# Patient Record
Sex: MALE | Race: WHITE | NOT HISPANIC OR LATINO | Employment: OTHER | URBAN - NONMETROPOLITAN AREA
[De-identification: names, ages, dates, MRNs, and addresses within clinical notes are randomized per-mention and may not be internally consistent; named-entity substitution may affect disease eponyms.]

---

## 2017-02-21 ENCOUNTER — OFFICE VISIT - GICH (OUTPATIENT)
Dept: FAMILY MEDICINE | Facility: OTHER | Age: 57
End: 2017-02-21

## 2017-02-21 ENCOUNTER — HISTORY (OUTPATIENT)
Dept: FAMILY MEDICINE | Facility: OTHER | Age: 57
End: 2017-02-21

## 2017-02-21 DIAGNOSIS — I10 ESSENTIAL (PRIMARY) HYPERTENSION: ICD-10-CM

## 2017-02-21 DIAGNOSIS — Z91.038 OTHER INSECT ALLERGY STATUS: ICD-10-CM

## 2017-02-21 LAB
A/G RATIO - HISTORICAL: 1.3 (ref 1–2)
ALBUMIN SERPL-MCNC: 4 G/DL (ref 3.5–5.7)
ALP SERPL-CCNC: 62 IU/L (ref 34–104)
ALT (SGPT) - HISTORICAL: 25 IU/L (ref 7–52)
ANION GAP - HISTORICAL: 8 (ref 5–18)
AST SERPL-CCNC: 20 IU/L (ref 13–39)
BILIRUB SERPL-MCNC: 0.6 MG/DL (ref 0.3–1)
BUN SERPL-MCNC: 21 MG/DL (ref 7–25)
BUN/CREAT RATIO - HISTORICAL: 18
CALCIUM SERPL-MCNC: 9.2 MG/DL (ref 8.6–10.3)
CHLORIDE SERPLBLD-SCNC: 105 MMOL/L (ref 98–107)
CHOL/HDL RATIO - HISTORICAL: 3.91
CHOLESTEROL TOTAL: 180 MG/DL
CO2 SERPL-SCNC: 25 MMOL/L (ref 21–31)
CREAT SERPL-MCNC: 1.2 MG/DL (ref 0.7–1.3)
GFR IF NOT AFRICAN AMERICAN - HISTORICAL: >60 ML/MIN/1.73M2
GLOBULIN - HISTORICAL: 3 G/DL (ref 2–3.7)
GLUCOSE SERPL-MCNC: 89 MG/DL (ref 70–105)
HDLC SERPL-MCNC: 46 MG/DL (ref 23–92)
LDLC SERPL CALC-MCNC: 116 MG/DL
NON-HDL CHOLESTEROL - HISTORICAL: 134 MG/DL
PATIENT STATUS - HISTORICAL: ABNORMAL
POTASSIUM SERPL-SCNC: 3.6 MMOL/L (ref 3.5–5.1)
PROT SERPL-MCNC: 7 G/DL (ref 6.4–8.9)
SODIUM SERPL-SCNC: 138 MMOL/L (ref 133–143)
TRIGL SERPL-MCNC: 90 MG/DL

## 2017-07-08 ENCOUNTER — HISTORY (OUTPATIENT)
Dept: FAMILY MEDICINE | Facility: OTHER | Age: 57
End: 2017-07-08

## 2017-07-08 ENCOUNTER — OFFICE VISIT - GICH (OUTPATIENT)
Dept: FAMILY MEDICINE | Facility: OTHER | Age: 57
End: 2017-07-08

## 2017-07-08 DIAGNOSIS — B02.9 ZOSTER WITHOUT COMPLICATIONS: ICD-10-CM

## 2017-12-27 NOTE — PROGRESS NOTES
Patient Information     Patient Name MRN Sex     Walter Oleary [id number] Male 1960      Progress Notes by Meka Coelho PA-C at 2017  9:00 AM     Author:  Meka Coelho PA-C Service:  (none) Author Type:  PHYS- Physician Assistant     Filed:  7/10/2017 11:24 AM Encounter Date:  2017 Status:  Signed     :  Meka Coelho PA-C (PHYS- Physician Assistant)            SUBJECTIVE:    Walter Oleary is a 56 y.o. male who presents for rash and headache.    HPI Comments: Patient reports that he had shoulder pain starting on Monday 7-3-17.  He  Went to the ER and because of the nature of the pain and a recent tick bit there, was started on doxycycline.  The burning type pain continued to worsen and yesterday he noticed that he was breaking out in a rash. He is concerned about shingles as he and the ER physician did discuss that possibility.    He is having a headache on the top of his head today. It is not a superficial skin pain. The headache is mild but the rash pain is severe 9/10 and that is his primary concern.        Patient Active Problem List     Diagnosis  Code     HTN (hypertension) I10     Psoriasis L40.9     Bee sting allergy Z91.038     Current Outpatient Prescriptions on File Prior to Visit       Medication  Sig Dispense Refill     acetaminophen (TYLENOL) 325 mg tablet Take 650 mg by mouth every 6 hours if needed. Max acetaminophen dose: 4000mg in 24 hrs.   Indications: FEVER, PAIN       aspirin 325 mg tablet Take 325 mg by mouth once daily with a meal.       diphenhydrAMINE (BENADRYL) 25 mg capsule Take 50 mg by mouth every 6 hours if needed (ALLERGY).       doxycycline (VIBRAMYCIN) 100 mg tablet Take 1 tablet by mouth 2 times daily for 14 days. 28 tablet 0     EPINEPHrine (EPIPEN) 0.3 mg/0.3 mL injection Inject 0.3 mg intramuscular one time if needed for Allergic Reaction for up to 1 dose. 2 Each 3     lisinopril-hydrochlorothiazide, 20-25 mg, (PRINZIDE, ZESTORETIC) 20-25 mg per  "tablet Take 1 tablet by mouth once daily. 90 tablet 3     No current facility-administered medications on file prior to visit.          REVIEW OF SYSTEMS:  Review of Systems   Constitutional: Negative for chills, fever and malaise/fatigue.   HENT: Negative for congestion and sore throat.    Eyes: Negative for pain, discharge and redness.   Respiratory: Negative for cough, shortness of breath and wheezing.    Cardiovascular: Negative for chest pain and palpitations.        He had some chest pain that was 'burning\" in nature when he was in the ER but that is better now.    Gastrointestinal: Negative for abdominal pain, nausea and vomiting.   Genitourinary: Negative for dysuria, frequency and urgency.   Musculoskeletal: Negative for myalgias.   Skin: Positive for rash. Negative for itching.   Neurological: Positive for headaches. Negative for dizziness.       OBJECTIVE:  /100  Pulse 60    EXAM:   Physical Exam   Constitutional: He is oriented to person, place, and time.   Well developed and well nourished in mild distress secondary to pain.    HENT:   Head: Normocephalic and atraumatic.   Right Ear: External ear normal.   Left Ear: External ear normal.   Nose: Nose normal.   Mouth/Throat: Oropharynx is clear and moist.   TMs intact and gray.   Eyes: Conjunctivae are normal. Pupils are equal, round, and reactive to light. Right eye exhibits no discharge. Left eye exhibits no discharge.   Neck: Neck supple.   Cardiovascular: Normal rate, regular rhythm and normal heart sounds.    No murmur heard.  Pulmonary/Chest: Effort normal and breath sounds normal. No respiratory distress.   Abdominal: Soft. Bowel sounds are normal. He exhibits no distension.   Musculoskeletal: He exhibits no edema.   Lymphadenopathy:     He has no cervical adenopathy.   Neurological: He is alert and oriented to person, place, and time. Gait normal.   Skin:   Erythematous papulovesicular rash following dermatome from posterior right " shoulder/back, under axilla to the upper arm.        ASSESSMENT/PLAN:    ICD-10-CM    1. Herpes zoster without complication B02.9 valACYclovir (VALTREX) 1 gram tablet      HYDROcodone-acetaminophen, 5-325 mg, (NORCO) per tablet        Plan:  Discussed contagious nature/ keep covered- do not contact infants or those who are immunocompromised.  Typical course discussed.  Complications discussed.  He can use hydrocodone for pain as he is in a great deal of pain secondary to this.    He may wish to follow up with his PCP next week but certainly should return should he have any new or worsening symptoms.  He understands and agrees with plan.      Meka Coelho PA-C ....................  7/10/2017   11:24 AM

## 2017-12-28 NOTE — PATIENT INSTRUCTIONS
Patient Information     Patient Name MRN Walter Barry [id number] Male 1960      Patient Instructions by Meka Coelho PA-C at 2017  9:00 AM     Author:  Meka Coelho PA-C Service:  (none) Author Type:  PHYS- Physician Assistant     Filed:  2017  9:32 AM Encounter Date:  2017 Status:  Signed     :  Meka Coelho PA-C (SHRAVAN- Physician Assistant)               Index German All languages Related topics   Shingles   ________________________________________________________________________  KEY POINTS    Shingles is a painful skin rash caused by the same virus that causes chickenpox.    If you ever had chickenpox, the virus stays in your body and can become active again at any time in your life and cause shingles.    Shingles is treated with antiviral and other medicines.  ________________________________________________________________________  What is shingles?   Shingles is a painful skin rash caused by the same virus that causes chickenpox.  Shingles is also called herpes zoster. This illness is most common in people over 50 years old.  What is the cause?  The virus that causes chickenpox and shingles is called varicella zoster. After you recover from chickenpox, the virus stays in your body but it is inactive. The virus can become active again at any time in your life and cause shingles if your body's immune system is weakened by things like:    Illness    Chemotherapy or radiation    Certain medicines, such as steroids    Aging  Your risk for getting shingles may be higher if you have been under a lot of stress. Sometimes shingles happens for no known reason.   You cannot have shingles unless you have already had chickenpox, and you cannot get shingles from someone else. However, a person with shingles can transmit chickenpox to a person who has never been exposed to the chickenpox virus. The virus is spread by contact with the blisters, which contain live virus in the fluid.  The blisters are no longer contagious after they dry up and form scabs.  What are the symptoms?  The first symptoms may include:    Burning, sharp pain; tingling; or numbness in your skin on one side of your body, head, or face    Severe itching or aching    Feeling tired    Feeling sick with fever, chills, headache, and upset stomach or belly pain  One to 14 days after you start feeling pain, you will notice a rash of small blisters on reddened skin. Within a few days after the rash appears, the blisters will turn yellow, then dry up and form scabs. Over the next 2 weeks the scabs drop off, and the skin heals over the next several days to weeks.  The blisters are usually found in a path, often extending from the back or side around to the middle of the belly. The blisters are usually on just one side of the body. They may also appear on one side of your face or scalp. The painful rash may be in the area of your ear or eye. When shingles occurs on the head or scalp, it may cause weakness of one side of the face, making that side of the face look droopy.  How is it diagnosed?  Your healthcare provider will ask about your symptoms and medical history and examine you. The diagnosis is usually obvious from the symptoms and the rash. If your provider wants to confirm the diagnosis, you may have lab tests to look for the virus in fluid from a blister.  How is it treated?  Several medicines can help treat shingles. Your healthcare provider may give you:    Antiviral medicine to help you heal faster    Pills or ointment to help relieve pain    Antibacterial medicine to treat bacterial infection of the blisters  For most people the pain of shingles goes away in the first month or two after the blisters heal. If you have shingles on your head or scalp, it may take longer for the pain to go away. Sometimes the virus damages a nerve. This can cause pain, numbness, or tingling for months or even years after the rash is healed  and is called postherpetic neuralgia. The older you are when you have shingles, the more likely it is that you will have postherpetic neuralgia. Taking antiviral medicine as soon as shingles is diagnosed may help prevent this problem.  How can I take care of myself?  Here are some things you can do to help relieve pain:    Take nonprescription pain medicine, such as acetaminophen. Acetaminophen may cause liver damage or other problems. Read the label carefully and take as directed. Unless recommended by your provider, don't take more than 3000 milligrams (mg) in 24 hours or take it for longer than 10 days. To make sure you don t take too much, check other medicines you take to see if they also contain acetaminophen. Ask your provider if you need to avoid drinking alcohol while taking this medicine.    Put cool, moist washcloths on the rash.    Try not to let clothing or bed linens rub against the rash and irritate it.  Rest in bed if you have fever and other symptoms of illness.  Ask your healthcare provider:    How and when you will get your test results    How long it will take to recover from this illness    If there are activities you should avoid and when you can return to normal activities    How to take care of yourself at home    What symptoms or problems you should watch for and what to do if you have them  Make sure you know when you should come back for a checkup. Keep all appointments for provider visits or tests.  How can I help prevent shingles?  If you have never had chickenpox, you can get a shot to help prevent infection with the chickenpox virus. Most children now get shots to prevent chickenpox.  If you are 50 or older, you can get a different shot that helps prevent shingles. This shot is recommended for people 60 years of age and older. The shingles shot does not always prevent shingles. However, if you do get shingles sometime after you got the shot, you may have less pain. The shingles shot is  not used to treat shingles once you have it.   You can also lessen your chances of getting shingles by trying to keep stress under control, exercising regularly, and eating a healthy diet.  If you have shingles, make sure that anyone who has not had chickenpox or the chickenpox shot does not come into close contact with you until the blisters are completely dry. You are no longer contagious after the blisters dry up and form scabs.  Developed by [employer].  Adult Advisor 2016.3 published by [employer].  Last modified: 2016-03-23  Last reviewed: 2015-11-02  This content is reviewed periodically and is subject to change as new health information becomes available. The information is intended to inform and educate and is not a replacement for medical evaluation, advice, diagnosis or treatment by a healthcare professional.  References   Adult Advisor 2016.3 Index    Copyright   2016 [employer], a division of [employer] All rights reserved.

## 2017-12-30 NOTE — NURSING NOTE
Patient Information     Patient Name MRN Sex Walter Reza [id number] Male 1960      Nursing Note by Kenna Clark at 2017  9:00 AM     Author:  Kenna Clark Service:  (none) Author Type:  (none)     Filed:  2017  9:26 AM Encounter Date:  2017 Status:  Signed     :  Kenna Clakr            Patient states that he was at ER on Thursday for tick bite.  Rash broke out after visit.  Having pain on right shoulder, headache  Kenna Clark LPN 2017 9:08 AM

## 2018-01-03 NOTE — NURSING NOTE
Patient Information     Patient Name MRN Walter Barry [id number] Male 1960      Nursing Note by Virgie Sadler at 2017  3:00 PM     Author:  Virgie Sadler Service:  (none) Author Type:  (none)     Filed:  2017  2:55 PM Encounter Date:  2017 Status:  Signed     :  Virgie Sadler            Patient presents for blood pressure medication refills.  Virgie Sadler LPN   2017  2:38 PM

## 2018-01-03 NOTE — PROGRESS NOTES
Patient Information     Patient Name MRN Sex     Walter Oleary [id number] Male 1960      Progress Notes by Jose Raul Trejo MD at 2017  3:00 PM     Author:  Jose Raul Trejo MD Service:  (none) Author Type:  Physician     Filed:  2017  3:02 PM Encounter Date:  2017 Status:  Signed     :  Jose Raul Trejo MD (Physician)            SUBJECTIVE:  Walter Oleary is a 56 y.o. male here for follow-up.  Patient has a history of hypertension and is due for refills. He is fasting today. He reports that his blood pressure is been well-controlled. He has had no chest pain or short of breath. He admits that he has not been working very hard over the winter and has gained some weight.    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 He is due for refills of his EpiPen.      Patient Active Problem List      Diagnosis Date Noted     Bee sting allergy 2017     Psoriasis 2016     HTN (hypertension) 09/15/2016       Past Medical History     Diagnosis  Date     Anaphylaxis 9/15/2016     Complex tear of medial meniscus of right knee      S/P right knee arthroscopy, surgery on 16       Past Surgical History       Procedure   Laterality Date     Total hip arthroplasty  Bilateral      [place]         Current Outpatient Prescriptions       Medication  Sig Dispense Refill     acetaminophen (TYLENOL) 325 mg tablet Take 650 mg by mouth every 6 hours if needed. Max acetaminophen dose: 4000mg in 24 hrs.   Indications: FEVER, PAIN       aspirin 325 mg tablet Take 325 mg by mouth once daily with a meal.       diphenhydrAMINE (BENADRYL) 25 mg capsule Take 50 mg by mouth every 6 hours if needed (ALLERGY).       EPINEPHrine (EPIPEN) 0.3 mg/0.3 mL injection Inject 0.3 mg intramuscular one time if needed for Allergic Reaction for up to 1 dose. 2 Each 3     lisinopril-hydrochlorothiazide, 20-25 mg, (PRINZIDE, ZESTORETIC) 20-25 mg per tablet Take 1 tablet by mouth once daily. 90 tablet  "3     No current facility-administered medications for this visit.      Medications have been reviewed by me and are current to the best of my knowledge and ability.      Allergies:  Allergies     Allergen  Reactions     Bee Sting [Hymenoptera Allergenic Extract] Hives and Angioedema       No family history on file.    Social History       Substance Use Topics         Smoking status:   Never Smoker     Smokeless tobacco:   Not on file     Alcohol use   0.0 oz/week     0 Standard drinks or equivalent per week        Comment: occasionally        ROS:    As above otherwise ROS is unremarkable.      OBJECTIVE:  /78  Pulse 72  Wt 107.5 kg (237 lb)  BMI 38.25 kg/m2    EXAM:  General Appearance: Pleasant, alert, appropriate appearance for age. No acute distress  Lungs: Normal chest wall and respirations. Clear to auscultation, no wheezes or crackles.  Cardiovascular: Regular rate and rhythm. S1, S2, no murmurs.  Musculoskeletal: No edema.    ASSESSEMENT AND PLAN:    Walter was seen today for blood pressure.    Diagnoses and all orders for this visit:    HTN (hypertension)  -     lisinopril-hydrochlorothiazide, 20-25 mg, (PRINZIDE, ZESTORETIC) 20-25 mg per tablet; Take 1 tablet by mouth once daily.  -     LIPID PANEL; Future  -     COMPLETE METABOLIC PANEL; Future    Bee sting allergy  -     EPINEPHrine (EPIPEN) 0.3 mg/0.3 mL injection; Inject 0.3 mg intramuscular one time if needed for Allergic Reaction for up to 1 dose.    Other orders  -     Cancel: EPINEPHrine (EPIPEN JR) 0.15 mg/0.3 mL injection; Inject 0.15 mg intramuscular one time if needed for Allergic Reaction for up to 1 dose.    Blood pressure well-controlled, continue current regimen. We'll check fasting labs today. We'll also update his EpiPen prescription. Discussed colonoscopy which she refuses at this time. He states that he will get this \"next time.\"      Jann Trejo MD          "

## 2018-01-27 VITALS
SYSTOLIC BLOOD PRESSURE: 122 MMHG | DIASTOLIC BLOOD PRESSURE: 100 MMHG | BODY MASS INDEX: 38.25 KG/M2 | HEART RATE: 72 BPM | SYSTOLIC BLOOD PRESSURE: 158 MMHG | DIASTOLIC BLOOD PRESSURE: 78 MMHG | WEIGHT: 237 LBS | HEART RATE: 60 BPM

## 2018-02-25 ENCOUNTER — DOCUMENTATION ONLY (OUTPATIENT)
Dept: FAMILY MEDICINE | Facility: OTHER | Age: 58
End: 2018-02-25

## 2018-02-25 PROBLEM — Z91.030 BEE STING ALLERGY: Status: ACTIVE | Noted: 2017-02-21

## 2018-02-25 RX ORDER — HYDROCODONE BITARTRATE AND ACETAMINOPHEN 5; 325 MG/1; MG/1
1 TABLET ORAL EVERY 6 HOURS PRN
COMMUNITY
Start: 2017-07-08 | End: 2019-04-23

## 2018-02-25 RX ORDER — ACETAMINOPHEN 325 MG/1
650 TABLET ORAL EVERY 6 HOURS PRN
COMMUNITY
End: 2019-04-23

## 2018-02-25 RX ORDER — DIPHENHYDRAMINE HCL 25 MG
50 CAPSULE ORAL EVERY 6 HOURS PRN
COMMUNITY

## 2018-02-25 RX ORDER — LISINOPRIL AND HYDROCHLOROTHIAZIDE 20; 25 MG/1; MG/1
1 TABLET ORAL DAILY
COMMUNITY
Start: 2017-02-21 | End: 2018-03-14

## 2018-02-25 RX ORDER — EPINEPHRINE 0.3 MG/.3ML
0.3 INJECTION SUBCUTANEOUS
COMMUNITY
Start: 2017-02-21 | End: 2018-03-30

## 2018-02-25 RX ORDER — ASPIRIN 325 MG
325 TABLET ORAL
COMMUNITY
End: 2023-01-09

## 2018-03-14 DIAGNOSIS — I10 HTN (HYPERTENSION): Primary | ICD-10-CM

## 2018-03-14 RX ORDER — LISINOPRIL AND HYDROCHLOROTHIAZIDE 20; 25 MG/1; MG/1
1 TABLET ORAL DAILY
Qty: 30 TABLET | Refills: 0 | Status: SHIPPED | OUTPATIENT
Start: 2018-03-14 | End: 2018-03-30

## 2018-03-30 ENCOUNTER — OFFICE VISIT (OUTPATIENT)
Dept: FAMILY MEDICINE | Facility: OTHER | Age: 58
End: 2018-03-30
Attending: FAMILY MEDICINE
Payer: COMMERCIAL

## 2018-03-30 ENCOUNTER — TELEPHONE (OUTPATIENT)
Dept: FAMILY MEDICINE | Facility: OTHER | Age: 58
End: 2018-03-30

## 2018-03-30 VITALS
DIASTOLIC BLOOD PRESSURE: 78 MMHG | HEART RATE: 53 BPM | WEIGHT: 235.6 LBS | SYSTOLIC BLOOD PRESSURE: 128 MMHG | HEIGHT: 66 IN | BODY MASS INDEX: 37.86 KG/M2

## 2018-03-30 DIAGNOSIS — Z11.59 NEED FOR HEPATITIS C SCREENING TEST: ICD-10-CM

## 2018-03-30 DIAGNOSIS — Z12.11 SPECIAL SCREENING FOR MALIGNANT NEOPLASMS, COLON: ICD-10-CM

## 2018-03-30 DIAGNOSIS — I10 HYPERTENSION, UNSPECIFIED TYPE: Primary | ICD-10-CM

## 2018-03-30 DIAGNOSIS — Z91.030 BEE STING ALLERGY: ICD-10-CM

## 2018-03-30 DIAGNOSIS — Z12.5 SCREENING FOR PROSTATE CANCER: ICD-10-CM

## 2018-03-30 LAB
ALBUMIN SERPL-MCNC: 4.2 G/DL (ref 3.5–5.7)
ALP SERPL-CCNC: 52 U/L (ref 34–104)
ALT SERPL W P-5'-P-CCNC: 31 U/L (ref 7–52)
ANION GAP SERPL CALCULATED.3IONS-SCNC: 8 MMOL/L (ref 3–14)
AST SERPL W P-5'-P-CCNC: 22 U/L (ref 13–39)
BILIRUB SERPL-MCNC: 0.7 MG/DL (ref 0.3–1)
BUN SERPL-MCNC: 22 MG/DL (ref 7–25)
CALCIUM SERPL-MCNC: 9.3 MG/DL (ref 8.6–10.3)
CHLORIDE SERPL-SCNC: 102 MMOL/L (ref 98–107)
CHOLEST SERPL-MCNC: 185 MG/DL
CO2 SERPL-SCNC: 28 MMOL/L (ref 21–31)
CREAT SERPL-MCNC: 1.34 MG/DL (ref 0.7–1.3)
GFR SERPL CREATININE-BSD FRML MDRD: 55 ML/MIN/1.7M2
GLUCOSE SERPL-MCNC: 106 MG/DL (ref 70–105)
HDLC SERPL-MCNC: 38 MG/DL (ref 23–92)
LDLC SERPL CALC-MCNC: 122 MG/DL
NONHDLC SERPL-MCNC: 147 MG/DL
POTASSIUM SERPL-SCNC: 3.6 MMOL/L (ref 3.5–5.1)
PROT SERPL-MCNC: 7.2 G/DL (ref 6.4–8.9)
PSA SERPL-MCNC: 0.36 NG/ML
SODIUM SERPL-SCNC: 138 MMOL/L (ref 134–144)
TRIGL SERPL-MCNC: 125 MG/DL

## 2018-03-30 PROCEDURE — G0472 HEP C SCREEN HIGH RISK/OTHER: HCPCS | Performed by: FAMILY MEDICINE

## 2018-03-30 PROCEDURE — G0463 HOSPITAL OUTPT CLINIC VISIT: HCPCS

## 2018-03-30 PROCEDURE — 84153 ASSAY OF PSA TOTAL: CPT | Performed by: FAMILY MEDICINE

## 2018-03-30 PROCEDURE — 36415 COLL VENOUS BLD VENIPUNCTURE: CPT | Performed by: FAMILY MEDICINE

## 2018-03-30 PROCEDURE — 80053 COMPREHEN METABOLIC PANEL: CPT | Performed by: FAMILY MEDICINE

## 2018-03-30 PROCEDURE — 80061 LIPID PANEL: CPT | Performed by: FAMILY MEDICINE

## 2018-03-30 PROCEDURE — 99214 OFFICE O/P EST MOD 30 MIN: CPT | Performed by: FAMILY MEDICINE

## 2018-03-30 RX ORDER — LISINOPRIL AND HYDROCHLOROTHIAZIDE 20; 25 MG/1; MG/1
1 TABLET ORAL DAILY
Qty: 90 TABLET | Refills: 3 | Status: SHIPPED | OUTPATIENT
Start: 2018-03-30 | End: 2019-04-04

## 2018-03-30 RX ORDER — HYDROCODONE BITARTRATE AND ACETAMINOPHEN 5; 325 MG/1; MG/1
1 TABLET ORAL EVERY 6 HOURS PRN
Qty: 20 TABLET | Status: CANCELLED | OUTPATIENT
Start: 2018-03-30

## 2018-03-30 RX ORDER — EPINEPHRINE 0.3 MG/.3ML
0.3 INJECTION SUBCUTANEOUS
Qty: 1.2 ML | Refills: 11 | Status: SHIPPED | OUTPATIENT
Start: 2018-03-30 | End: 2019-04-04

## 2018-03-30 ASSESSMENT — PAIN SCALES - GENERAL: PAINLEVEL: NO PAIN (0)

## 2018-03-30 NOTE — MR AVS SNAPSHOT
After Visit Summary   3/30/2018    Walter Oleary    MRN: [id number]           Patient Information     Date Of Birth          1960        Visit Information        Provider Department      3/30/2018 10:30 AM Jose Raul Trejo MD [facility]        Today's Diagnoses     Hypertension, unspecified type    -  1    Special screening for malignant neoplasms, colon        Bee sting allergy        Need for hepatitis C screening test        Screening for prostate cancer           Follow-ups after your visit        Additional Services     GASTROENTEROLOGY ADULT REF PROCEDURE ONLY       Last Lab Result: Creatinine (mg/dL)       Date                     Value                 07/06/2017               1.23             ----------  Body mass index is 38.03 kg/(m^2).     Needed:  No  Language:  English    Patient will be contacted to schedule procedure.     Please be aware that coverage of these services is subject to the terms and limitations of your health insurance plan.  Call member services at your health plan with any benefit or coverage questions.  Any procedures must be performed at a [facility] facility OR coordinated by your clinic's referral office.    Please bring the following with you to your appointment:    (1) Any X-Rays, CTs or MRIs which have been performed.  Contact the facility where they were done to arrange for  prior to your scheduled appointment.    (2) List of current medications   (3) This referral request   (4) Any documents/labs given to you for this referral                  Who to contact     If you have questions or need follow up information about today's clinic visit or your schedule please contact [facility] directly at [phone].  Normal or non-critical lab and imaging results will be communicated to you by MyChart, letter or phone within 4 business days after the clinic has received the results. If you do not hear from  "us within 7 days, please contact the clinic through [facility] or phone. If you have a critical or abnormal lab result, we will notify you by phone as soon as possible.  Submit refill requests through [facility] or call your pharmacy and they will forward the refill request to us. Please allow 3 business days for your refill to be completed.          Additional Information About Your Visit        y primeharTPACK Information     [facility] lets you send messages to your doctor, view your test results, renew your prescriptions, schedule appointments and more. To sign up, go to [email or web address] . Click on \"Log in\" on the left side of the screen, which will take you to the Welcome page. Then click on \"Sign up Now\" on the right side of the page.     You will be asked to enter the access code listed below, as well as some personal information. Please follow the directions to create your username and password.     Your access code is: [email or web address]  Expires: 2018 10:38 AM     Your access code will  in 90 days. If you need help or a new code, please call your [facility] clinic or [phone].        Care EveryWhere ID     This is your Care EveryWhere ID. This could be used by other organizations to access your [facility] medical records  [id number]        Your Vitals Were     Pulse Height BMI (Body Mass Index)             53 5' 6\" (1.676 m) 38.03 kg/m2          Blood Pressure from Last 3 Encounters:   18 128/78   17 (!) 158/100   17 122/78    Weight from Last 3 Encounters:   18 235 lb 9.6 oz (106.9 kg)   17 237 lb (107.5 kg)   16 233 lb (105.7 kg)              We Performed the Following     Comprehensive metabolic panel     GASTROENTEROLOGY ADULT REF PROCEDURE ONLY     Hepatitis C antibody     Lipid Profile     PSA GH          Where to get your medicines      These medications were sent to [facility] [address] [place], [place] - [address] AT [address] , [facility] [address]     Phone:  [phone]     EPINEPHrine 0.3 MG/0.3ML injection 2-pack    lisinopril-hydrochlorothiazide 20-25 MG per tablet          Primary Care Provider Office Phone # Fax #    Jose Raul Trejo [phone]       [address] [facility] [address]        Equal Access to Services     LARY PATE : Hadii aad ku hadasho Soomaali, waaxda luqadaha, qaybta kaalmada adeegyada, waxay idiin hayaan adeeg khreinaldosh lamarcell ah. So [facility] [phone].    ATENCIÓN: Si jerson mabry, tiene a byrne disposición servicios gratuitos de asistencia lingüística. Llame al [phone].    We comply with applicable federal civil rights laws and [place] laws. We do not discriminate on the basis of race, color, national origin, age, disability, sex, sexual orientation, or gender identity.            Thank you!     Thank you for choosing [facility] AND [facility]  for your care. Our goal is always to provide you with excellent care. Hearing back from our patients is one way we can continue to improve our services. Please take a few minutes to complete the written survey that you may receive in the mail after your visit with us. Thank you!             Your Updated Medication List - Protect others around you: Learn how to safely use, store and throw away your medicines at www.disposemymeds.org.          This list is accurate as of 3/30/18 10:38 AM.  Always use your most recent med list.                   Brand Name Dispense Instructions for use Diagnosis    acetaminophen 325 MG tablet    TYLENOL     Take 650 mg by mouth every 6 hours as needed for pain or fever . Max acetaminophen dose: 4000 mg in 24 hrs.        diphenhydrAMINE 25 MG capsule    BENADRYL     Take 50 mg by mouth every 6 hours as needed for allergies        EPINEPHrine 0.3 MG/0.3ML injection 2-pack    EPIPEN/ADRENACLICK/or ANY BX GENERIC EQUIV    1.2 mL    Inject 0.3 mLs (0.3 mg) into the muscle once as needed for  anaphylaxis For up to 1 dose    Bee sting allergy       GOODSENSE ASPIRIN 325 MG tablet   Generic drug:  aspirin      Take 325 mg by mouth daily with food        HYDROcodone-acetaminophen 5-325 MG per tablet    NORCO     Take 1 tablet by mouth every 6 hours as needed for pain . Max acetaminophen dose: 4000 mg in 24 hrs.        lisinopril-hydrochlorothiazide 20-25 MG per tablet    PRINZIDE/ZESTORETIC    90 tablet    Take 1 tablet by mouth daily    Hypertension, unspecified type

## 2018-03-30 NOTE — TELEPHONE ENCOUNTER
Called patient to schedule colonoscopy, He did not want to schedule at this time stated that he would call at a later to schedule.  Nani Peterson on 3/30/2018 at 12:57 PM

## 2018-03-30 NOTE — PROGRESS NOTES
SUBJECTIVE:  Walter Oleary is a 57 year old male here for follow-up.  He has a history of hypertension.  Does not check his blood pressure at home.  He has been tolerating his medication well.  He is due for fasting labs.    He has a history of bee sting allergy and needs a refill of his epi-pens.    He is ready to have a colonoscopy performed.    He has no other concerns.      Patient Active Problem List    Diagnosis Date Noted     Bee sting allergy 02/21/2017     Priority: Medium     Psoriasis 11/18/2016     Priority: Medium     HTN (hypertension) 09/15/2016     Priority: Medium     Status post THR (total hip replacement) 04/14/2015     Priority: Medium     S/P total hip arthroplasty, right 03/10/2015     Priority: Medium     Lipoma of skin and subcutaneous tissue 01/05/2015     Priority: Medium     Hypertension goal BP (blood pressure) < 140/90 09/02/2011     Priority: Medium     CARDIOVASCULAR SCREENING; LDL GOAL LESS THAN 130 10/31/2010     Priority: Medium       Past Medical History:   Diagnosis Date     Anaphylactic shock     9/15/2016     Complex tear of medial meniscus of right knee as current injury     No Comments Provided     Other specified postprocedural states     12/7/2016       Past Surgical History:   Procedure Laterality Date     OTHER SURGICAL HISTORY      2015,[id number],TOTAL HIP ARTHROPLASTY,Bilateral,[place]       Current Outpatient Prescriptions   Medication Sig Dispense Refill     lisinopril-hydrochlorothiazide (PRINZIDE/ZESTORETIC) 20-25 MG per tablet Take 1 tablet by mouth daily 90 tablet 3     EPINEPHrine (EPIPEN/ADRENACLICK/OR ANY BX GENERIC EQUIV) 0.3 MG/0.3ML injection 2-pack Inject 0.3 mLs (0.3 mg) into the muscle once as needed for anaphylaxis For up to 1 dose 1.2 mL 11     [DISCONTINUED] lisinopril-hydrochlorothiazide (PRINZIDE/ZESTORETIC) 20-25 MG per tablet Take 1 tablet by mouth daily 30 tablet 0     acetaminophen (TYLENOL) 325 MG tablet Take 650 mg by mouth every 6 hours as  "needed for pain or fever . Max acetaminophen dose: 4000 mg in 24 hrs.       diphenhydrAMINE (BENADRYL) 25 MG capsule Take 50 mg by mouth every 6 hours as needed for allergies       HYDROcodone-acetaminophen (NORCO) 5-325 MG per tablet Take 1 tablet by mouth every 6 hours as needed for pain . Max acetaminophen dose: 4000 mg in 24 hrs.       aspirin (GOODSENSE ASPIRIN) 325 MG tablet Take 325 mg by mouth daily with food         Allergies:  Allergies   Allergen Reactions     Wasp Venom Protein Hives     Other reaction(s): Angioedema     No Known Drug Allergies        History reviewed. No pertinent family history.    Social History   Substance Use Topics     Smoking status: Never Smoker     Smokeless tobacco: Never Used     Alcohol use 0.0 oz/week      Comment: Alcoholic Drinks/day: occasionally       ROS:    As above otherwise ROS is unremarkable.      OBJECTIVE:  /78 (BP Location: Right arm, Patient Position: Sitting, Cuff Size: Adult Large)  Pulse 53  Ht 5' 6\" (1.676 m)  Wt 235 lb 9.6 oz (106.9 kg)  BMI 38.03 kg/m2    EXAM:  General Appearance: Pleasant, alert, appropriate appearance for age. No acute distress  Head: Normal. Normocephalic, atraumatic.  Eyes: PERRL, EOMI  Ears: Normal TM's bilaterally. Normal auditory canals and external ears.   OroPharynx: Dental hygiene adequate. Normal buccal mucosa. Normal pharynx.  Neck: Supple, no masses or nodes, no lymphadenopathy.  No thyromegaly.  Lungs: Normal chest wall and respirations. Clear to auscultation, no wheezes or crackles.  Cardiovascular: Regular rate and rhythm. S1, S2, no murmurs.  Gastrointestinal: Soft, nontender, no abnormal masses or organomegaly. BS normal.  Musculoskeletal: No edema.  Skin: no concerning or new rashes.  Neurologic Exam: CN 2-12 grossly intact.  Normal gait.  Symmetric DTRs, No focal motor or sensory deficits. No tremor.  Psychiatric Exam: Alert and oriented, appropriate affect.    ASSESSEMENT AND PLAN:    1. Hypertension, " unspecified type    2. Special screening for malignant neoplasms, colon    3. Bee sting allergy    4. Need for hepatitis C screening test    5. Screening for prostate cancer      Blood pressures controlled, continue current regimen.  We will get fasting labs today.    We will refer for colonoscopy, he is optimized for his upcoming procedure.    Epi-pens were refilled again.    We will screen for hepatitis C and PSA.    Jann Trejo MD  Family Medicine      This document was prepared using voice generated software.  While every attempt was made for accuracy, grammatical errors may exist.

## 2018-03-30 NOTE — LETTER
April 2, 2018      Walter Oleary  [address]  [address] [address] [address]        Dear ,    We are writing to inform you of your test results.    Your diabetes screening, fasting glucose, came back elevated at 106 this places you in the prediabetes category.  This can increase your risk of diabetes in the future.  This can be improved with at least 30 minutes of daily exercise and improving your diet.  We should check this again in 1 year.    Your cholesterol panel, liver, kidney and prostate testing all came back normal.    Finally, screen for hepatitis C came back negative.    Resulted Orders   Comprehensive metabolic panel   Result Value Ref Range    Sodium 138 134 - 144 mmol/L    Potassium 3.6 3.5 - 5.1 mmol/L    Chloride 102 98 - 107 mmol/L    Carbon Dioxide 28 21 - 31 mmol/L    Anion Gap 8 3 - 14 mmol/L    Glucose 106 (H) 70 - 105 mg/dL    Urea Nitrogen 22 7 - 25 mg/dL    Creatinine 1.34 (H) 0.70 - 1.30 mg/dL    GFR Estimate 55 (L) >60 mL/min/1.7m2    GFR Estimate If Black 66 >60 mL/min/1.7m2    Calcium 9.3 8.6 - 10.3 mg/dL    Bilirubin Total 0.7 0.3 - 1.0 mg/dL    Albumin 4.2 3.5 - 5.7 g/dL    Protein Total 7.2 6.4 - 8.9 g/dL    Alkaline Phosphatase 52 34 - 104 U/L    ALT 31 7 - 52 U/L    AST 22 13 - 39 U/L   Lipid Profile   Result Value Ref Range    Cholesterol 185 <200 mg/dL    Triglycerides 125 <150 mg/dL    HDL Cholesterol 38 23 - 92 mg/dL    LDL Cholesterol Calculated 122 (H) <100 mg/dL      Comment:      Above desirable:  100-129 mg/dl  Borderline High:  130-159 mg/dL  High:             160-189 mg/dL  Very high:       >189 mg/dl      Non HDL Cholesterol 147 (H) <130 mg/dL      Comment:      Above Desirable:  130-159 mg/dl  Borderline high:  160-189 mg/dl  High:             190-219 mg/dl  Very high:       >219 mg/dl     PSA GH   Result Value Ref Range    Prostate Specific Antigen 0.361 <3.100 ng/mL   Hepatitis C antibody   Result Value Ref Range    Hepatitis C Antibody Nonreactive  NR^Nonreactive      Comment:      Assay performance characteristics have not been established for newborns,   infants, and children         If you have any questions or concerns, please call the clinic at the number listed above.       Sincerely,        oJse Raul Trejo MD

## 2018-03-30 NOTE — NURSING NOTE
Patient presents today for a medication check.  Jania Ortega LPN .............3/30/2018  10:25 AM

## 2018-04-02 LAB — HCV AB SERPL QL IA: NONREACTIVE

## 2018-07-23 NOTE — PROGRESS NOTES
Patient Information     Patient Name  Walter Oleary MRN  [id number] Sex  Male   1960      Letter by Jose Raul Trejo MD at      Author:  Jose Raul Trejo MD Service:  (none) Author Type:  (none)    Filed:   Encounter Date:  2017 Status:  (Other)           Walter Oleary  [address]  [facility] [address]          2017    Dear Mr. Oleary:    Your recent lab values can be seen below.     Your cholesterol panel, diabetes screening with fasting glucose, liver and kidney tests all came back normal. This is reassuring and should be checked again in one year.    If you have any questions, do not hesitate to contact me.    Results for orders placed or performed in visit on 17      LIPID PANEL      Result  Value Ref Range    CHOLESTEROL,TOTAL 180 <200 mg/dL    TRIGLYCERIDES 90 <150 mg/dL    HDL CHOLESTEROL 46 23 - 92 mg/dL    NON-HDL CHOLESTEROL 134 <145 mg/dl    CHOL/HDL RATIO            3.91 <4.50                    LDL CHOLESTEROL 116 (H) <100 mg/dL    PATIENT STATUS            NOT GIVEN                   COMP METABOLIC PANEL      Result  Value Ref Range    SODIUM 138 133 - 143 mmol/L    POTASSIUM 3.6 3.5 - 5.1 mmol/L    CHLORIDE 105 98 - 107 mmol/L    CO2,TOTAL 25 21 - 31 mmol/L    ANION GAP 8 5 - 18                    GLUCOSE 89 70 - 105 mg/dL    CALCIUM 9.2 8.6 - 10.3 mg/dL    BUN 21 7 - 25 mg/dL    CREATININE 1.20 0.70 - 1.30 mg/dL    BUN/CREAT RATIO           18                    GFR if African American >60 >60 ml/min/1.73m2    GFR if not African American >60 >60 ml/min/1.73m2    ALBUMIN 4.0 3.5 - 5.7 g/dL    PROTEIN,TOTAL 7.0 6.4 - 8.9 g/dL    GLOBULIN                  3.0 2.0 - 3.7 g/dL    A/G RATIO 1.3 1.0 - 2.0                    BILIRUBIN,TOTAL 0.6 0.3 - 1.0 mg/dL    ALK PHOSPHATASE 62 34 - 104 IU/L    ALT (SGPT) 25 7 - 52 IU/L    AST (SGOT) 20 13 - 39 IU/L         Sincerely,        Jann Trejo MD  Family Medicine

## 2019-04-01 DIAGNOSIS — Z91.030 BEE STING ALLERGY: ICD-10-CM

## 2019-04-01 DIAGNOSIS — I10 ESSENTIAL (PRIMARY) HYPERTENSION: Primary | ICD-10-CM

## 2019-04-04 RX ORDER — EPINEPHRINE 0.3 MG/.3ML
0.3 INJECTION SUBCUTANEOUS
Qty: 1.2 ML | Refills: 2 | Status: SHIPPED | OUTPATIENT
Start: 2019-04-04 | End: 2021-04-27

## 2019-04-04 RX ORDER — LISINOPRIL AND HYDROCHLOROTHIAZIDE 20; 25 MG/1; MG/1
1 TABLET ORAL DAILY
Qty: 90 TABLET | Refills: 0 | Status: SHIPPED | OUTPATIENT
Start: 2019-04-04 | End: 2019-04-23

## 2019-04-04 NOTE — TELEPHONE ENCOUNTER
[facility] sent Rx request for the following:      lisinopril-hydrochlorothiazide (PRINZIDE/ZESTORETIC) 20-25 MG tablet  Sig: Take 1 tablet by mouth daily  Last Prescription Date:   3/30/18  Last Fill Qty/Refills:         90, R-3    Routing refill request to provider for review/approval because:  Diuretics (Including Combos) Protocol Failed4/4 9:23 AM   Blood pressure under 140/90 in past 12 months    Recent (12 mo) or future (30 days) visit within the authorizing provider's specialty    Normal serum creatinine on file in past 12 months    Normal serum potassium on file in past 12 months    Normal serum sodium on file in past 12 months     EPINEPHrine (EPIPEN/ADRENACLICK/OR ANY BX GENERIC EQUIV) 0.3 MG/0.3ML injection 2-pack  Sig: Inject 0.3 mLs (0.3 mg) into the muscle once as needed for anaphylaxis For up to 1 dose  Last Prescription Date:   3/30/18  Last Fill Qty/Refills:         1.2 ml, R-11    Routing refill request to provider for review/approval because:  Anaphylaxis Kits Protocol Failed4/4 9:23 AM   Recent (12 mo) or future (30 days) visit witin the authorizing provider's specialty     Last Office Visit:              3/30/18  Future Office visit:           None.    Patient is due for annual exam and labs. Reminder letter sent. Lisinopril-hydrochlorothiazide approved per [facility] [place] [place] refill protocol, for 90 day kasia refill and Epi pen for 1.2 ml, R-2 kasia refill. Daya Portillo RN .............. 4/4/2019  10:09 AM

## 2019-04-23 ENCOUNTER — OFFICE VISIT (OUTPATIENT)
Dept: FAMILY MEDICINE | Facility: OTHER | Age: 59
End: 2019-04-23
Attending: FAMILY MEDICINE
Payer: COMMERCIAL

## 2019-04-23 ENCOUNTER — MEDICAL CORRESPONDENCE (OUTPATIENT)
Dept: HEALTH INFORMATION MANAGEMENT | Facility: OTHER | Age: 59
End: 2019-04-23

## 2019-04-23 VITALS
RESPIRATION RATE: 18 BRPM | SYSTOLIC BLOOD PRESSURE: 136 MMHG | DIASTOLIC BLOOD PRESSURE: 86 MMHG | WEIGHT: 237.6 LBS | BODY MASS INDEX: 38.18 KG/M2 | HEIGHT: 66 IN | HEART RATE: 60 BPM

## 2019-04-23 DIAGNOSIS — I10 ESSENTIAL (PRIMARY) HYPERTENSION: Primary | ICD-10-CM

## 2019-04-23 DIAGNOSIS — Z12.5 SCREENING FOR PROSTATE CANCER: ICD-10-CM

## 2019-04-23 DIAGNOSIS — E66.01 MORBID OBESITY (H): ICD-10-CM

## 2019-04-23 DIAGNOSIS — Z91.030 BEE STING ALLERGY: ICD-10-CM

## 2019-04-23 LAB
ALBUMIN SERPL-MCNC: 4.1 G/DL (ref 3.5–5.7)
ALP SERPL-CCNC: 52 U/L (ref 34–104)
ALT SERPL W P-5'-P-CCNC: 28 U/L (ref 7–52)
ANION GAP SERPL CALCULATED.3IONS-SCNC: 10 MMOL/L (ref 3–14)
AST SERPL W P-5'-P-CCNC: 20 U/L (ref 13–39)
BILIRUB SERPL-MCNC: 0.6 MG/DL (ref 0.3–1)
BUN SERPL-MCNC: 22 MG/DL (ref 7–25)
CALCIUM SERPL-MCNC: 9.2 MG/DL (ref 8.6–10.3)
CHLORIDE SERPL-SCNC: 103 MMOL/L (ref 98–107)
CHOLEST SERPL-MCNC: 171 MG/DL
CO2 SERPL-SCNC: 24 MMOL/L (ref 21–31)
CREAT SERPL-MCNC: 1.14 MG/DL (ref 0.7–1.3)
GFR SERPL CREATININE-BSD FRML MDRD: 66 ML/MIN/{1.73_M2}
GLUCOSE SERPL-MCNC: 113 MG/DL (ref 70–105)
HDLC SERPL-MCNC: 42 MG/DL (ref 23–92)
LDLC SERPL CALC-MCNC: 106 MG/DL
NONHDLC SERPL-MCNC: 129 MG/DL
POTASSIUM SERPL-SCNC: 3.7 MMOL/L (ref 3.5–5.1)
PROT SERPL-MCNC: 7 G/DL (ref 6.4–8.9)
PSA SERPL-ACNC: 0.42 NG/ML
SODIUM SERPL-SCNC: 137 MMOL/L (ref 134–144)
TRIGL SERPL-MCNC: 114 MG/DL

## 2019-04-23 PROCEDURE — 80061 LIPID PANEL: CPT | Mod: ZL | Performed by: FAMILY MEDICINE

## 2019-04-23 PROCEDURE — 99214 OFFICE O/P EST MOD 30 MIN: CPT | Performed by: FAMILY MEDICINE

## 2019-04-23 PROCEDURE — 36415 COLL VENOUS BLD VENIPUNCTURE: CPT | Mod: ZL | Performed by: FAMILY MEDICINE

## 2019-04-23 PROCEDURE — G0463 HOSPITAL OUTPT CLINIC VISIT: HCPCS

## 2019-04-23 PROCEDURE — G0103 PSA SCREENING: HCPCS | Mod: ZL | Performed by: FAMILY MEDICINE

## 2019-04-23 PROCEDURE — 80053 COMPREHEN METABOLIC PANEL: CPT | Mod: ZL | Performed by: FAMILY MEDICINE

## 2019-04-23 RX ORDER — LISINOPRIL AND HYDROCHLOROTHIAZIDE 20; 25 MG/1; MG/1
1 TABLET ORAL DAILY
Qty: 90 TABLET | Refills: 3 | Status: SHIPPED | OUTPATIENT
Start: 2019-04-23 | End: 2020-05-22

## 2019-04-23 ASSESSMENT — MIFFLIN-ST. JEOR: SCORE: 1840.5

## 2019-04-23 ASSESSMENT — PAIN SCALES - GENERAL: PAINLEVEL: NO PAIN (0)

## 2019-04-23 NOTE — LETTER
April 23, 2019      Walter Oleary  [address]  [facility] [address]        Dear ,    We are writing to inform you of your test results.    Your fasting glucose is elevated, placing you in the prediabetes category.  This increases your risk of diabetes in the future.  This can be improved with increasing your daily exercise to at least 30 minutes a day and improving your diet.    Your cholesterol panel, liver, kidney and prostate testing all came back normal.  All of your labs should be checked again in 1 year.    Resulted Orders   PSA Screen GH   Result Value Ref Range    PSA Screen 0.420 <3.100 ng/mL   Lipid Profile   Result Value Ref Range    Cholesterol 171 <200 mg/dL    Triglycerides 114 <150 mg/dL    HDL Cholesterol 42 23 - 92 mg/dL    LDL Cholesterol Calculated 106 (H) <100 mg/dL      Comment:      Above desirable:  100-129 mg/dl  Borderline High:  130-159 mg/dL  High:             160-189 mg/dL  Very high:       >189 mg/dl      Non HDL Cholesterol 129 <130 mg/dL   Comprehensive metabolic panel   Result Value Ref Range    Sodium 137 134 - 144 mmol/L    Potassium 3.7 3.5 - 5.1 mmol/L    Chloride 103 98 - 107 mmol/L    Carbon Dioxide 24 21 - 31 mmol/L    Anion Gap 10 3 - 14 mmol/L    Glucose 113 (H) 70 - 105 mg/dL    Urea Nitrogen 22 7 - 25 mg/dL    Creatinine 1.14 0.70 - 1.30 mg/dL    GFR Estimate 66 >60 mL/min/[1.73_m2]    GFR Estimate If Black 80 >60 mL/min/[1.73_m2]    Calcium 9.2 8.6 - 10.3 mg/dL    Bilirubin Total 0.6 0.3 - 1.0 mg/dL    Albumin 4.1 3.5 - 5.7 g/dL    Protein Total 7.0 6.4 - 8.9 g/dL    Alkaline Phosphatase 52 34 - 104 U/L    ALT 28 7 - 52 U/L    AST 20 13 - 39 U/L       If you have any questions or concerns, please call the clinic at the number listed above.       Sincerely,        Jose Raul Trejo MD

## 2019-04-23 NOTE — PROGRESS NOTES
SUBJECTIVE:  Walter Oleary is a 58 year old male here for follow-up.  He has a history of hypertension.  This is been well controlled though he admits he has not been checking this regularly at home.  He has been tolerating his medication well.    He has a history of severe allergy to bee stings.  He recently picked up epi-pens.  He has not needed to use this in the last year.      Patient Active Problem List    Diagnosis Date Noted     Obesity (BMI 35.0-39.9) with comorbidity (H) 04/23/2019     Priority: Medium     Bee sting allergy 02/21/2017     Priority: Medium     Psoriasis 11/18/2016     Priority: Medium     Essential (primary) hypertension 09/15/2016     Priority: Medium     Status post THR (total hip replacement) 04/14/2015     Priority: Medium     S/P total hip arthroplasty, right 03/10/2015     Priority: Medium     Lipoma of skin and subcutaneous tissue 01/05/2015     Priority: Medium     Hypertension goal BP (blood pressure) < 140/90 09/02/2011     Priority: Medium     CARDIOVASCULAR SCREENING; LDL GOAL LESS THAN 130 10/31/2010     Priority: Medium       Past Medical History:   Diagnosis Date     Anaphylactic shock     9/15/2016     Complex tear of medial meniscus of right knee as current injury     No Comments Provided     Other specified postprocedural states     12/7/2016       Past Surgical History:   Procedure Laterality Date     C TOTAL HIP ARTHROPLASTY Bilateral 2015    [place]       Current Outpatient Medications   Medication Sig Dispense Refill     aspirin (GOODSENSE ASPIRIN) 325 MG tablet Take 325 mg by mouth daily with food       diphenhydrAMINE (BENADRYL) 25 MG capsule Take 50 mg by mouth every 6 hours as needed for allergies       EPINEPHrine (EPIPEN/ADRENACLICK/OR ANY BX GENERIC EQUIV) 0.3 MG/0.3ML injection 2-pack Inject 0.3 mLs (0.3 mg) into the muscle once as needed for anaphylaxis For up to 1 dose 1.2 mL 2     lisinopril-hydrochlorothiazide (PRINZIDE/ZESTORETIC) 20-25 MG tablet Take 1  "tablet by mouth daily 90 tablet 3       Allergies:  Allergies   Allergen Reactions     Wasp Venom Protein Hives     Other reaction(s): Angioedema     No Known Drug Allergies        History reviewed. No pertinent family history.    Social History     Tobacco Use     Smoking status: Passive Smoke Exposure - Never Smoker     Smokeless tobacco: Never Used   Substance Use Topics     Alcohol use: Yes     Alcohol/week: 0.0 oz     Comment: Alcoholic Drinks/day: occasionally     Drug use: Unknown     Types: Other     Comment: Drug use: No       ROS:    As above otherwise ROS is unremarkable.      OBJECTIVE:  /86   Pulse 60   Resp 18   Ht 1.676 m (5' 6\")   Wt 107.8 kg (237 lb 9.6 oz)   BMI 38.35 kg/m      EXAM:  General Appearance: Pleasant, alert, appropriate appearance for age. No acute distress  Head: Normal. Normocephalic, atraumatic.  Lungs: Normal chest wall and respirations. Clear to auscultation, no wheezes or crackles.  Cardiovascular: Regular rate and rhythm. S1, S2, no murmurs.  Gastrointestinal: Soft, nontender, no abnormal masses or organomegaly. BS normal.  Musculoskeletal: No edema.  Skin: no concerning or new rashes.  Neurologic Exam: CN 2-12 grossly intact.  Normal gait.  Symmetric DTRs, No focal motor or sensory deficits. No tremor.  Psychiatric Exam: Alert and oriented, appropriate affect.    ASSESSEMENT AND PLAN:    1. Essential (primary) hypertension    2. Screening for prostate cancer    3. Morbid obesity (H)    4. Bee sting allergy      Blood pressures controlled, continue current regimen.  Will check yearly fasting labs today.    Discussed importance of losing weight through diet and exercise.    Epi pens were filled.    We completed paperwork for [facility] to be completed.    Jann Trejo MD  Family Medicine      This document was prepared using voice generated software.  While every attempt was made for accuracy, grammatical errors may exist.  "

## 2019-04-23 NOTE — NURSING NOTE
Patient presents today for annual medication management.  Medication Reconciliation Complete    Fela Nevarez LPN  4/23/2019 9:31 AM

## 2019-05-13 ENCOUNTER — TRANSFERRED RECORDS (OUTPATIENT)
Dept: HEALTH INFORMATION MANAGEMENT | Facility: OTHER | Age: 59
End: 2019-05-13

## 2019-12-20 ENCOUNTER — OFFICE VISIT (OUTPATIENT)
Dept: FAMILY MEDICINE | Facility: OTHER | Age: 59
End: 2019-12-20
Attending: NURSE PRACTITIONER
Payer: COMMERCIAL

## 2019-12-20 VITALS
HEIGHT: 66 IN | BODY MASS INDEX: 38.6 KG/M2 | HEART RATE: 88 BPM | SYSTOLIC BLOOD PRESSURE: 132 MMHG | WEIGHT: 240.2 LBS | TEMPERATURE: 98.7 F | OXYGEN SATURATION: 98 % | RESPIRATION RATE: 20 BRPM | DIASTOLIC BLOOD PRESSURE: 88 MMHG

## 2019-12-20 DIAGNOSIS — J20.9 ACUTE BRONCHITIS, UNSPECIFIED ORGANISM: Primary | ICD-10-CM

## 2019-12-20 PROCEDURE — G0463 HOSPITAL OUTPT CLINIC VISIT: HCPCS

## 2019-12-20 PROCEDURE — 99213 OFFICE O/P EST LOW 20 MIN: CPT | Performed by: NURSE PRACTITIONER

## 2019-12-20 RX ORDER — AZITHROMYCIN 250 MG/1
TABLET, FILM COATED ORAL
Qty: 6 TABLET | Refills: 0 | Status: SHIPPED | OUTPATIENT
Start: 2019-12-20 | End: 2020-02-10

## 2019-12-20 RX ORDER — CODEINE PHOSPHATE AND GUAIFENESIN 10; 100 MG/5ML; MG/5ML
1-2 SOLUTION ORAL
Qty: 118 ML | Refills: 0 | Status: SHIPPED | OUTPATIENT
Start: 2019-12-20 | End: 2020-02-10

## 2019-12-20 ASSESSMENT — MIFFLIN-ST. JEOR: SCORE: 1852.29

## 2019-12-20 ASSESSMENT — PAIN SCALES - GENERAL: PAINLEVEL: NO PAIN (0)

## 2019-12-20 NOTE — PROGRESS NOTES
"HPI:    Walter Oleary is a 58 year old male  who presents to clinic today for cough.    Cough and chest congestion for the past 11 days, states worsening the past 3 days.  Difficulty sleeping due to hard coughing and difficulty catching breath due to coughing.  Coughing up yellow green phlegm the past few days.  Chest feels tight and heavy.  Feeling winded with coughing.  Hard constant coughing fits.  Hard time catching breath due to coughing fits.  Chills last night and sweats this morning.  Throat irritation recently from coughing.  Minimal runny/stuffy nose, able to breath through nose.  Throbbing headaches from coughing.  Appetite at baseline.  Drinking tea with honey.  Steaming.  Energy declining, \"doesn't have enough energy to go fishing which is not like him, normally doesn't stay still for long.\"        Taking Coricidin HBP cough and cold medication without relief.  No flu shot          Past Medical History:   Diagnosis Date     Anaphylactic shock     9/15/2016     Complex tear of medial meniscus of right knee as current injury     No Comments Provided     Other specified postprocedural states     12/7/2016     Past Surgical History:   Procedure Laterality Date     ABSTRACT COLOGUARD-NO CHARGE  05/2019    normal     C TOTAL HIP ARTHROPLASTY Bilateral 2015    [place]     Social History     Tobacco Use     Smoking status: Passive Smoke Exposure - Never Smoker     Smokeless tobacco: Never Used   Substance Use Topics     Alcohol use: Yes     Alcohol/week: 0.0 standard drinks     Comment: Alcoholic Drinks/day: occasionally     Current Outpatient Medications   Medication Sig Dispense Refill     aspirin (GOODSENSE ASPIRIN) 325 MG tablet Take 325 mg by mouth daily with food       diphenhydrAMINE (BENADRYL) 25 MG capsule Take 50 mg by mouth every 6 hours as needed for allergies       EPINEPHrine (EPIPEN/ADRENACLICK/OR ANY BX GENERIC EQUIV) 0.3 MG/0.3ML injection 2-pack Inject 0.3 mLs (0.3 mg) into the muscle once " "as needed for anaphylaxis For up to 1 dose 1.2 mL 2     lisinopril-hydrochlorothiazide (PRINZIDE/ZESTORETIC) 20-25 MG tablet Take 1 tablet by mouth daily 90 tablet 3     Allergies   Allergen Reactions     Wasp Venom Protein Hives     Other reaction(s): Angioedema     No Known Drug Allergies          Past medical history, past surgical history, current medications and allergies reviewed and accurate to the best of my knowledge.        ROS:  Refer to HPI    /88 (BP Location: Right arm, Patient Position: Sitting, Cuff Size: Adult Regular)   Pulse 88   Temp 98.7  F (37.1  C) (Tympanic)   Resp 20   Ht 1.676 m (5' 6\")   Wt 109 kg (240 lb 3.2 oz)   SpO2 98%   BMI 38.77 kg/m      EXAM:  General Appearance: Miserable appearing adult male, appropriate appearance for age. No acute distress  Ears: Left TM grey, translucent with bony landmarks appreciated, no erythema, no effusion, no bulging, no purulence.  Right TM grey, translucent with bony landmarks appreciated, no erythema, no effusion, no bulging, no purulence.  Left auditory canal clear.  Right auditory canal clear.  Normal external ears, non tender.  Eyes: conjunctivae normal without erythema or irritation, no drainage or crusting, no eyelid swelling, pupils equal   Orophayrnx: moist mucous membranes, posterior pharynx without erythema, tonsils without hypertrophy, no erythema, no exudates or petechiae, no post nasal drip seen, no trismus, voice clear.    Nose: no drainage noted   Neck: supple without adenopathy  Respiratory: normal chest wall and respirations.  Mild course congestion to auscultation bilaterally, no wheezing, crackles or rhonchi.  Hard coughing fits.  Deep harsh course congested cough appreciated, oxygen saturation 98%  Cardiac: RRR with no murmurs  Musculoskeletal:  Equal movement of bilateral upper extremities.  Equal movement of bilateral lower extremities.  Normal gait.    Psychological: normal affect, alert and oriented, mildly " anxious        ASSESSMENT/PLAN:  1. Acute bronchitis, unspecified organism    - azithromycin (ZITHROMAX) 250 MG tablet; Take 2 tablets (500 mg) by mouth daily for 1 day, THEN 1 tablet (250 mg) daily for 4 days.  Dispense: 6 tablet; Refill: 0    - guaiFENesin-codeine (ROBITUSSIN AC) 100-10 MG/5ML solution; Take 5-10 mLs by mouth nightly as needed for cough  Dispense: 118 mL; Refill: 0    Symptomatic treatment - Encouraged fluids, salt water gargles, honey, elevation, humidifier, lozenges, tea, topical vapor rub, etc     May use over-the-counter Tylenol or ibuprofen PRN    Discussed warning signs/symptoms indicative of need to f/u    Follow up if symptoms persist or worsen or concerns      I explained my diagnostic considerations and recommendations to the patient, who voiced understanding and agreement with the treatment plan. All questions were answered. We discussed potential side effects of any prescribed or recommended therapies, as well as expectations for response to treatments.    Disclaimer:  This note consists of words and symbols derived from keyboarding, dictation, or using voice recognition software. As a result, there may be errors in the script that have gone undetected. Please consider this when interpreting information found in this note.

## 2019-12-20 NOTE — NURSING NOTE
Patient presents to the clinic for chest congestion, productive cough x 11 days. Patient has taken otc cold medicine for treatment.  Medication Reconciliation: complete    Felisha Tony, CMA

## 2019-12-23 NOTE — PROGRESS NOTES
Noted Robitussin AC failed to transmit to pharmacy    VORB given to pharmacist Trisha.     Geno Agudelo RN  ....................  12/23/2019   10:16 AM

## 2020-02-10 ENCOUNTER — OFFICE VISIT (OUTPATIENT)
Dept: FAMILY MEDICINE | Facility: OTHER | Age: 60
End: 2020-02-10
Attending: FAMILY MEDICINE
Payer: COMMERCIAL

## 2020-02-10 VITALS
DIASTOLIC BLOOD PRESSURE: 82 MMHG | HEIGHT: 66 IN | TEMPERATURE: 97.7 F | OXYGEN SATURATION: 96 % | HEART RATE: 76 BPM | WEIGHT: 238 LBS | SYSTOLIC BLOOD PRESSURE: 128 MMHG | BODY MASS INDEX: 38.25 KG/M2 | RESPIRATION RATE: 20 BRPM

## 2020-02-10 DIAGNOSIS — M79.674 GREAT TOE PAIN, RIGHT: Primary | ICD-10-CM

## 2020-02-10 DIAGNOSIS — M79.605 LEG PAIN, ANTERIOR, LEFT: ICD-10-CM

## 2020-02-10 PROCEDURE — 99213 OFFICE O/P EST LOW 20 MIN: CPT | Performed by: FAMILY MEDICINE

## 2020-02-10 PROCEDURE — G0463 HOSPITAL OUTPT CLINIC VISIT: HCPCS

## 2020-02-10 RX ORDER — SULFAMETHOXAZOLE/TRIMETHOPRIM 800-160 MG
1 TABLET ORAL 2 TIMES DAILY
Qty: 14 TABLET | Refills: 0 | Status: SHIPPED | OUTPATIENT
Start: 2020-02-10 | End: 2021-04-27

## 2020-02-10 RX ORDER — COLCHICINE 0.6 MG/1
TABLET ORAL
Qty: 3 TABLET | Refills: 1 | Status: SHIPPED | OUTPATIENT
Start: 2020-02-10 | End: 2022-05-31

## 2020-02-10 ASSESSMENT — PAIN SCALES - GENERAL: PAINLEVEL: MILD PAIN (3)

## 2020-02-10 ASSESSMENT — MIFFLIN-ST. JEOR: SCORE: 1837.31

## 2020-02-10 ASSESSMENT — ENCOUNTER SYMPTOMS
SHORTNESS OF BREATH: 0
CHILLS: 0
FEVER: 0

## 2020-02-10 NOTE — PROGRESS NOTES
SUBJECTIVE:   Walter Oleary is a 59 year old male who presents to clinic today for the following health issues:    HPI  Right Great Toe Pain:  Symptoms started three weeks ago.  Began with pain which exponentially increased and became associated with swelling.  Reports both redness and warmth.  Tried soaking it in cold water and eating dried cherries.  Not sure whether this helped.  He also tried Ibuprofen which seemed to help some.  Reports that current pain is a 2/10.  Swelling, redness, and warmth have all significantly improved.  Concerned that this was an attack of gout.  Reports that he has had similar symptoms in the past in his left ankle.  He takes a combination thiazide medication for control of his blood pressure.  He denies alcohol consumption.    Left Leg Pain:  Hit his shin against the edge of a 10 inch ice fishing hole on February 1st.  He had an open wound which has since healed over.  Reports early bruising.  Developed redness a couple of days ago.  Associated with warmth.  No drainage.  Reports that episode is similar to staph infections he has had in the past.  States that he has a history of MRSA.  Tried soaking in Epsom salt with some improvement.  He has psoriasis that he does not currently treat.  He is concerned because he will be leaving for [place] tomorrow.    Past Medical History:   Diagnosis Date     Anaphylactic shock     9/15/2016     Complex tear of medial meniscus of right knee as current injury     No Comments Provided     Other specified postprocedural states     12/7/2016      Past Surgical History:   Procedure Laterality Date     ABSTRACT COLOGSANTO-NO CHARGE  05/2019    normal     C TOTAL HIP ARTHROPLASTY Bilateral 2015    [place]     History reviewed. No pertinent family history.  Social History     Tobacco Use     Smoking status: Passive Smoke Exposure - Never Smoker     Smokeless tobacco: Never Used   Substance Use Topics     Alcohol use: Yes      "Alcohol/week: 0.0 standard drinks     Comment: Alcoholic Drinks/day: occasionally     Current Outpatient Medications   Medication Sig Dispense Refill     aspirin (GOODSENSE ASPIRIN) 325 MG tablet Take 325 mg by mouth daily with food       colchicine (COLCYRS) 0.6 MG tablet Take 1.2 mg (2 tablets) by mouth at first sign of flare.  Then take 0.6 mg (1 tablet) by mouth one hour later.  Do not exceed 1.8 mg (3 tablets) in a one hour period. 3 tablet 1     diphenhydrAMINE (BENADRYL) 25 MG capsule Take 50 mg by mouth every 6 hours as needed for allergies       EPINEPHrine (EPIPEN/ADRENACLICK/OR ANY BX GENERIC EQUIV) 0.3 MG/0.3ML injection 2-pack Inject 0.3 mLs (0.3 mg) into the muscle once as needed for anaphylaxis For up to 1 dose 1.2 mL 2     lisinopril-hydrochlorothiazide (PRINZIDE/ZESTORETIC) 20-25 MG tablet Take 1 tablet by mouth daily 90 tablet 3     sulfamethoxazole-trimethoprim (BACTRIM DS/SEPTRA DS) 800-160 MG tablet Take 1 tablet by mouth 2 times daily 14 tablet 0     Allergies   Allergen Reactions     Wasp Venom Protein Hives     Other reaction(s): Angioedema     No Known Drug Allergies      Review of Systems   Constitutional: Negative for chills and fever.   Respiratory: Negative for shortness of breath.    Cardiovascular: Negative for chest pain.      OBJECTIVE:     /82   Pulse 76   Temp 97.7  F (36.5  C) (Tympanic)   Resp 20   Ht 1.676 m (5' 6\")   Wt 108 kg (238 lb)   SpO2 96%   BMI 38.41 kg/m    Body mass index is 38.41 kg/m .  Physical Exam  Constitutional:       General: He is not in acute distress.     Appearance: Normal appearance. He is obese. He is not ill-appearing.   Cardiovascular:      Rate and Rhythm: Normal rate and regular rhythm.   Pulmonary:      Effort: Pulmonary effort is normal.      Breath sounds: Normal breath sounds. No wheezing, rhonchi or rales.   Musculoskeletal:      Comments: Right great toe swollen at MTP joint with purple-red discoloration.  Normal range of " motion.  Left lower leg swollen.  No calf tenderness.   Skin:     Comments: Left anterior lower leg with well-healing wound with eschar formation.  Surrounding mild erythema with overlying maculopapular rash.  No fluctuance, induration, or drainage.  Warm to palpation.  Ecchymosis present over medal aspect of left lower leg.  Dry, flaking skin and excoriation present over lateral aspect.   Neurological:      Mental Status: He is alert.   Psychiatric:         Mood and Affect: Mood normal.       ASSESSMENT/PLAN:     1. Great toe pain, right  Suspect secondary to gout, and it sounds like he has had attacks in the past.  Colchicine prescription provided for recurrence.  Encouraged him to return to clinic at symptom onset for verification.  If episodes are frequent, may need prophylactic medication or alternative antihypertensive regimen.  - colchicine (COLCYRS) 0.6 MG tablet; Take 1.2 mg (2 tablets) by mouth at first sign of flare.  Then take 0.6 mg (1 tablet) by mouth one hour later.  Do not exceed 1.8 mg (3 tablets) in a one hour period.  Dispense: 3 tablet; Refill: 1    2. Leg pain, anterior, left  Appears that symptoms are more likely secondary to psoriasis; however, he does have a history of staph and MRSA infections.  Prescription provided for Bactrim if erythema worsens or drainage develops, as he will be out of town.  If antibiotics are required and symptoms do not improve after 48 hours, he will need to be reevaluated.  He verbalized understanding.  - sulfamethoxazole-trimethoprim (BACTRIM DS/SEPTRA DS) 800-160 MG tablet; Take 1 tablet by mouth 2 times daily  Dispense: 14 tablet; Refill: 0      DO GRAND [facility]IC[facility] Patricio [facility]

## 2020-02-10 NOTE — NURSING NOTE
"Chief Complaint   Patient presents with     Arthritis     gout Right foot     Cellulitis     Left lower leg     Fell in ice 2/1/20 and was cut on his left lower leg. Now it's red, warm, and painful.    Right great to pain.    Initial /82   Pulse 76   Temp 97.7  F (36.5  C) (Tympanic)   Resp 20   Ht 1.676 m (5' 6\")   Wt 108 kg (238 lb)   SpO2 96%   BMI 38.41 kg/m   Estimated body mass index is 38.41 kg/m  as calculated from the following:    Height as of this encounter: 1.676 m (5' 6\").    Weight as of this encounter: 108 kg (238 lb).    Medication Reconciliation: complete      Norma J. Gosselin, LPN  "

## 2020-05-22 DIAGNOSIS — I10 ESSENTIAL (PRIMARY) HYPERTENSION: ICD-10-CM

## 2020-05-22 RX ORDER — LISINOPRIL AND HYDROCHLOROTHIAZIDE 20; 25 MG/1; MG/1
1 TABLET ORAL DAILY
Qty: 90 TABLET | Refills: 3 | Status: SHIPPED | OUTPATIENT
Start: 2020-05-22 | End: 2021-04-27

## 2020-05-22 NOTE — LETTER
May 22, 2020      Walter Oleary  [address]  [facility] [address]        Dear Walter,     A refill of lisinopril-hydrochlorothiazide (ZESTORETIC) 20-25 MG tablet has been requested by your pharmacy.  We noticed that it has been greater than 12 months since your last comprehensive visit and labs with Jose Raul Trejo MD.  A limited 90 day supply has been sent to your pharmacy at this time.    Additional refills require a medication management appointment.  Your health is very important to us.  Please call the clinic at [phone] to schedule your appointment.  We now offer video/telephone visits.    Thank you,    The Refill Nurse  [facility]

## 2020-05-22 NOTE — TELEPHONE ENCOUNTER
Pt is out    Pt due for appt.  Will félix up for limited supply, send appt reminder letter, add note to pharmacy, and route to PCP for consideration.     [facility] sent Rx request for the following:   lisinopril-hydrochlorothiazide (ZESTORETIC) 20-25 MG tablet  Sig:  Take 1 tablet by mouth daily    Last Prescription Date:   4/23/2019  Last Fill Qty/Refills:         90, R-3    Last Office Visit:              2/10/2020   Future Office visit:           None     Routing refill request to provider for review/approval because:  Diuretics (Including Combos) Protocol Failed  Normal serum creatinine on file in past 12 months  Normal serum potassium on file in past 12 months  Normal serum sodium on file in past 12 months    Geno Agudelo RN  ....................  5/22/2020   11:36 AM

## 2020-05-22 NOTE — TELEPHONE ENCOUNTER
Reason for call: Medication or medication refill    Name of medication requested: lisinopril-hydrochlorothiazide 20-25mg    Are you out of the medication? Yes    What pharmacy do you use? [facility]    Preferred method for responding to this message: Telephone Call    Phone number patient can be reached at: Other phone number:  [phone]    If we cannot reach you directly, may we leave a detailed response at the number you provided?

## 2021-04-27 ENCOUNTER — OFFICE VISIT (OUTPATIENT)
Dept: FAMILY MEDICINE | Facility: OTHER | Age: 61
End: 2021-04-27
Attending: FAMILY MEDICINE
Payer: COMMERCIAL

## 2021-04-27 VITALS
RESPIRATION RATE: 18 BRPM | OXYGEN SATURATION: 98 % | TEMPERATURE: 98 F | BODY MASS INDEX: 38.89 KG/M2 | DIASTOLIC BLOOD PRESSURE: 86 MMHG | WEIGHT: 242 LBS | SYSTOLIC BLOOD PRESSURE: 130 MMHG | HEART RATE: 60 BPM | HEIGHT: 66 IN

## 2021-04-27 DIAGNOSIS — Z12.5 SCREENING FOR PROSTATE CANCER: ICD-10-CM

## 2021-04-27 DIAGNOSIS — I10 ESSENTIAL (PRIMARY) HYPERTENSION: ICD-10-CM

## 2021-04-27 DIAGNOSIS — Z13.220 SCREENING FOR LIPOID DISORDERS: ICD-10-CM

## 2021-04-27 DIAGNOSIS — E66.01 MORBID OBESITY (H): ICD-10-CM

## 2021-04-27 DIAGNOSIS — Z00.00 ROUTINE HISTORY AND PHYSICAL EXAMINATION OF ADULT: Primary | ICD-10-CM

## 2021-04-27 DIAGNOSIS — Z91.030 BEE STING ALLERGY: ICD-10-CM

## 2021-04-27 LAB
ALBUMIN SERPL-MCNC: 4.2 G/DL (ref 3.5–5.7)
ALP SERPL-CCNC: 55 U/L (ref 34–104)
ALT SERPL W P-5'-P-CCNC: 28 U/L (ref 7–52)
ANION GAP SERPL CALCULATED.3IONS-SCNC: 10 MMOL/L (ref 3–14)
AST SERPL W P-5'-P-CCNC: 21 U/L (ref 13–39)
BILIRUB SERPL-MCNC: 0.7 MG/DL (ref 0.3–1)
BUN SERPL-MCNC: 22 MG/DL (ref 7–25)
CALCIUM SERPL-MCNC: 9.5 MG/DL (ref 8.6–10.3)
CHLORIDE SERPL-SCNC: 99 MMOL/L (ref 98–107)
CHOLEST SERPL-MCNC: 162 MG/DL
CO2 SERPL-SCNC: 28 MMOL/L (ref 21–31)
CREAT SERPL-MCNC: 1.37 MG/DL (ref 0.7–1.3)
GFR SERPL CREATININE-BSD FRML MDRD: 53 ML/MIN/{1.73_M2}
GLUCOSE SERPL-MCNC: 118 MG/DL (ref 70–105)
HDLC SERPL-MCNC: 42 MG/DL (ref 23–92)
LDLC SERPL CALC-MCNC: 93 MG/DL
NONHDLC SERPL-MCNC: 120 MG/DL
POTASSIUM SERPL-SCNC: 3.9 MMOL/L (ref 3.5–5.1)
PROT SERPL-MCNC: 7.3 G/DL (ref 6.4–8.9)
PSA SERPL-ACNC: 0.65 NG/ML
SODIUM SERPL-SCNC: 137 MMOL/L (ref 134–144)
TRIGL SERPL-MCNC: 133 MG/DL

## 2021-04-27 PROCEDURE — G0103 PSA SCREENING: HCPCS | Mod: ZL | Performed by: FAMILY MEDICINE

## 2021-04-27 PROCEDURE — 36415 COLL VENOUS BLD VENIPUNCTURE: CPT | Mod: ZL | Performed by: FAMILY MEDICINE

## 2021-04-27 PROCEDURE — 80061 LIPID PANEL: CPT | Mod: ZL | Performed by: FAMILY MEDICINE

## 2021-04-27 PROCEDURE — G0463 HOSPITAL OUTPT CLINIC VISIT: HCPCS

## 2021-04-27 PROCEDURE — 80053 COMPREHEN METABOLIC PANEL: CPT | Mod: ZL | Performed by: FAMILY MEDICINE

## 2021-04-27 PROCEDURE — 99396 PREV VISIT EST AGE 40-64: CPT | Performed by: FAMILY MEDICINE

## 2021-04-27 RX ORDER — EPINEPHRINE 0.3 MG/.3ML
0.3 INJECTION SUBCUTANEOUS
Qty: 1.2 ML | Refills: 2 | Status: SHIPPED | OUTPATIENT
Start: 2021-04-27 | End: 2022-05-02

## 2021-04-27 RX ORDER — LISINOPRIL AND HYDROCHLOROTHIAZIDE 20; 25 MG/1; MG/1
1 TABLET ORAL DAILY
Qty: 90 TABLET | Refills: 3 | Status: SHIPPED | OUTPATIENT
Start: 2021-04-27 | End: 2022-05-31

## 2021-04-27 ASSESSMENT — PAIN SCALES - GENERAL: PAINLEVEL: NO PAIN (0)

## 2021-04-27 ASSESSMENT — ANXIETY QUESTIONNAIRES
5. BEING SO RESTLESS THAT IT IS HARD TO SIT STILL: NOT AT ALL
1. FEELING NERVOUS, ANXIOUS, OR ON EDGE: NOT AT ALL
IF YOU CHECKED OFF ANY PROBLEMS ON THIS QUESTIONNAIRE, HOW DIFFICULT HAVE THESE PROBLEMS MADE IT FOR YOU TO DO YOUR WORK, TAKE CARE OF THINGS AT HOME, OR GET ALONG WITH OTHER PEOPLE: NOT DIFFICULT AT ALL
GAD7 TOTAL SCORE: 0
7. FEELING AFRAID AS IF SOMETHING AWFUL MIGHT HAPPEN: NOT AT ALL
6. BECOMING EASILY ANNOYED OR IRRITABLE: NOT AT ALL
3. WORRYING TOO MUCH ABOUT DIFFERENT THINGS: NOT AT ALL
2. NOT BEING ABLE TO STOP OR CONTROL WORRYING: NOT AT ALL

## 2021-04-27 ASSESSMENT — PATIENT HEALTH QUESTIONNAIRE - PHQ9
SUM OF ALL RESPONSES TO PHQ QUESTIONS 1-9: 0
5. POOR APPETITE OR OVEREATING: NOT AT ALL

## 2021-04-27 ASSESSMENT — MIFFLIN-ST. JEOR: SCORE: 1850.45

## 2021-04-27 NOTE — NURSING NOTE
Patient presents today for annual physical.    Medication Reconciliation Complete    Fela Nevarez LPN  4/27/2021 9:25 AM

## 2021-04-27 NOTE — LETTER
April 27, 2021      Walter Oleayr  [address]  [place] [place] [facility] [address]        Dear Belindaor,    We are writing to inform you of your test results.    Your diabetes screening a fasting glucose and cholesterol panel came back improved when compared to the last time we checked this.  This is reassuring, keep up the good work.    Your kidney function is mildly elevated today however I suspect this is due to being fasting and slightly dehydrated.  I would recommend you increase your water intake.    Your liver and prostate testing came back normal.    All of your labs should be repeated again in 1 year.    Resulted Orders   PSA Screen GH   Result Value Ref Range    PSA Screen 0.652 <3.100 ng/mL      Comment:      The DXI Access PSAS WHO assay is a two site immunoenzymatic assay. Assay   values obtained with different assay methods cannot be used interchangeably   due to differences in assay methods and reagent specificity.     Lipid Panel   Result Value Ref Range    Cholesterol 162 <200 mg/dL    Triglycerides 133 <150 mg/dL    HDL Cholesterol 42 23 - 92 mg/dL    LDL Cholesterol Calculated 93 <100 mg/dL      Comment:      Desirable:       <100 mg/dl    Non HDL Cholesterol 120 <130 mg/dL   Comprehensive Metabolic Panel   Result Value Ref Range    Sodium 137 134 - 144 mmol/L    Potassium 3.9 3.5 - 5.1 mmol/L    Chloride 99 98 - 107 mmol/L    Carbon Dioxide 28 21 - 31 mmol/L    Anion Gap 10 3 - 14 mmol/L    Glucose 118 (H) 70 - 105 mg/dL    Urea Nitrogen 22 7 - 25 mg/dL    Creatinine 1.37 (H) 0.70 - 1.30 mg/dL    GFR Estimate 53 (L) >60 mL/min/[1.73_m2]    GFR Estimate If Black 64 >60 mL/min/[1.73_m2]    Calcium 9.5 8.6 - 10.3 mg/dL    Bilirubin Total 0.7 0.3 - 1.0 mg/dL    Albumin 4.2 3.5 - 5.7 g/dL    Protein Total 7.3 6.4 - 8.9 g/dL    Alkaline Phosphatase 55 34 - 104 U/L    ALT 28 7 - 52 U/L    AST 21 13 - 39 U/L       If you have any questions or concerns, please call the clinic at the number listed above.        Sincerely,      Jose Raul Trejo

## 2021-04-27 NOTE — PROGRESS NOTES
SUBJECTIVE:  Walter Oleary is a 60 year old male here for annual exam.  He is a history of significant bee allergy requiring EpiPen's and is due for refills.  He has not had to use his EpiPen for over 2 years.    He has a history of hypertension which has been well controlled.  He is going to be working on losing weight through increasing his daily exercise and improving his diet.    He otherwise has no new concerns today.      Patient Active Problem List    Diagnosis Date Noted     Obesity (BMI 35.0-39.9) with comorbidity (H) 04/23/2019     Priority: Medium     Bee sting allergy 02/21/2017     Priority: Medium     Psoriasis 11/18/2016     Priority: Medium     Essential (primary) hypertension 09/15/2016     Priority: Medium     Hypertension goal BP (blood pressure) < 140/90 09/02/2011     Priority: Medium       Past Medical History:   Diagnosis Date     Anaphylactic shock     9/15/2016     Complex tear of medial meniscus of right knee as current injury     No Comments Provided     Other specified postprocedural states     12/7/2016     S/P total hip arthroplasty, right 3/10/2015       Past Surgical History:   Procedure Laterality Date     ABSTRACT COLOGUARD-NO CHARGE  05/2019    normal     C TOTAL HIP ARTHROPLASTY Bilateral 2015    [place]       Current Outpatient Medications   Medication Sig Dispense Refill     aspirin (GOODSENSE ASPIRIN) 325 MG tablet Take 325 mg by mouth daily with food       diphenhydrAMINE (BENADRYL) 25 MG capsule Take 50 mg by mouth every 6 hours as needed for allergies       EPINEPHrine (ANY BX GENERIC EQUIV) 0.3 MG/0.3ML injection 2-pack Inject 0.3 mLs (0.3 mg) into the muscle once as needed for anaphylaxis For up to 1 dose 1.2 mL 2     lisinopril-hydrochlorothiazide (ZESTORETIC) 20-25 MG tablet Take 1 tablet by mouth daily 90 tablet 3     colchicine (COLCYRS) 0.6 MG tablet Take 1.2 mg (2 tablets) by mouth at first sign of flare.  Then take 0.6 mg (1 tablet) by mouth one hour later.  Do  "not exceed 1.8 mg (3 tablets) in a one hour period. (Patient not taking: Reported on 4/27/2021) 3 tablet 1       Allergies:  Allergies   Allergen Reactions     Wasp Venom Protein Hives     Other reaction(s): Angioedema     No Known Drug Allergies        No family history on file.    Social History     Tobacco Use     Smoking status: Passive Smoke Exposure - Never Smoker     Smokeless tobacco: Never Used   Substance Use Topics     Alcohol use: Yes     Alcohol/week: 0.0 standard drinks     Comment: Alcoholic Drinks/day: occasionally     Drug use: Never       ROS:    As above otherwise ROS is unremarkable.      OBJECTIVE:  /86   Pulse 60   Temp 98  F (36.7  C)   Resp 18   Ht 1.676 m (5' 6\")   Wt 109.8 kg (242 lb)   SpO2 98%   BMI 39.06 kg/m      EXAM:  General Appearance: Pleasant, alert, appropriate appearance for age. No acute distress  Head: Normal. Normocephalic, atraumatic.  Eyes: PERRL, EOMI  Ears: Normal TM's bilaterally. Normal auditory canals and external ears.   OroPharynx: Dental hygiene adequate. Normal buccal mucosa. Normal pharynx.  Neck: Supple, no masses or nodes, no lymphadenopathy.  No thyromegaly.  Lungs: Normal chest wall and respirations. Clear to auscultation, no wheezes or crackles.  Cardiovascular: Regular rate and rhythm. S1, S2, no murmurs.  Gastrointestinal: Soft, nontender, no abnormal masses or organomegaly. BS normal.  Lipoma is once again seen near his umbilicus.  This does not appear to be a hernia.  Musculoskeletal: No edema.  Skin: no concerning or new rashes.  Neurologic Exam: CN 2-12 grossly intact.  Normal gait.  Symmetric DTRs, No focal motor or sensory deficits. No tremor.  Psychiatric Exam: Alert and oriented, appropriate affect.    ASSESSEMENT AND PLAN:    1. Routine history and physical examination of adult    2. Essential (primary) hypertension    3. Bee sting allergy    4. Screening for prostate cancer    5. Screening for lipoid disorders    6. Obesity (BMI " 35.0-39.9) with comorbidity (H)      He will be due for Cologuard again in 2022.  He is due for second Covid vaccine in a couple of weeks.  He will consider shingles vaccine afterwards.    Fasting labs are drawn today.    Blood pressure medication filled again for another year.    Jann Trejo MD  Family Medicine

## 2021-04-28 ASSESSMENT — ANXIETY QUESTIONNAIRES: GAD7 TOTAL SCORE: 0

## 2022-05-02 DIAGNOSIS — Z91.030 BEE STING ALLERGY: ICD-10-CM

## 2022-05-02 RX ORDER — EPINEPHRINE 0.3 MG/.3ML
INJECTION SUBCUTANEOUS
Qty: 2 EACH | Refills: 1 | Status: SHIPPED | OUTPATIENT
Start: 2022-05-02 | End: 2022-05-31

## 2022-05-02 NOTE — TELEPHONE ENCOUNTER
[facility] sent Rx request for the following:      Requested Prescriptions   Pending Prescriptions Disp Refills     EPINEPHrine (ANY BX GENERIC EQUIV) 0.3 MG/0.3ML injection 2-pack [Pharmacy Med Name: EPINEPHRINE 0.3MG INJ 2 PACK]       Sig: INJECT 0.3MLS INTO THE MUSCLE ONCE AS NEEDED FOR ANAPHYLAXIS.       Anaphylaxis Kits Protocol Failed - 5/2/2022  1:55 PM        Failed - Recent (12 mo) or future (30 days) visit witin the authorizing provider's specialty     Last Prescription Date:   4/27/21  Last Fill Qty/Refills:         1.2 ml, R-2    Last Office Visit:              4/27/21   Future Office visit:           None    Pt due for annual exam. Routing to provider for refill consideration. Routing to  OUTREACH APPT REQUESTS pool, to assist Pt in scheduling appointment.     Daya Portillo RN .............. 5/2/2022  1:56 PM

## 2022-05-31 ENCOUNTER — OFFICE VISIT (OUTPATIENT)
Dept: FAMILY MEDICINE | Facility: OTHER | Age: 62
End: 2022-05-31
Attending: FAMILY MEDICINE
Payer: COMMERCIAL

## 2022-05-31 VITALS
SYSTOLIC BLOOD PRESSURE: 130 MMHG | WEIGHT: 248.6 LBS | BODY MASS INDEX: 39.95 KG/M2 | HEIGHT: 66 IN | OXYGEN SATURATION: 98 % | TEMPERATURE: 98.4 F | HEART RATE: 65 BPM | DIASTOLIC BLOOD PRESSURE: 88 MMHG | RESPIRATION RATE: 16 BRPM

## 2022-05-31 DIAGNOSIS — I10 ESSENTIAL (PRIMARY) HYPERTENSION: ICD-10-CM

## 2022-05-31 DIAGNOSIS — Z12.5 SCREENING FOR PROSTATE CANCER: ICD-10-CM

## 2022-05-31 DIAGNOSIS — I10 HYPERTENSION GOAL BP (BLOOD PRESSURE) < 140/90: ICD-10-CM

## 2022-05-31 DIAGNOSIS — Z12.11 SPECIAL SCREENING FOR MALIGNANT NEOPLASMS, COLON: ICD-10-CM

## 2022-05-31 DIAGNOSIS — Z13.220 SCREENING CHOLESTEROL LEVEL: ICD-10-CM

## 2022-05-31 DIAGNOSIS — Z91.030 BEE STING ALLERGY: ICD-10-CM

## 2022-05-31 DIAGNOSIS — M79.674 GREAT TOE PAIN, RIGHT: ICD-10-CM

## 2022-05-31 DIAGNOSIS — Z00.00 ROUTINE HISTORY AND PHYSICAL EXAMINATION OF ADULT: Primary | ICD-10-CM

## 2022-05-31 LAB
ALBUMIN SERPL-MCNC: 4.3 G/DL (ref 3.5–5.7)
ALP SERPL-CCNC: 61 U/L (ref 34–104)
ALT SERPL W P-5'-P-CCNC: 34 U/L (ref 7–52)
ANION GAP SERPL CALCULATED.3IONS-SCNC: 7 MMOL/L (ref 3–14)
AST SERPL W P-5'-P-CCNC: 26 U/L (ref 13–39)
BILIRUB SERPL-MCNC: 0.5 MG/DL (ref 0.3–1)
BUN SERPL-MCNC: 16 MG/DL (ref 7–25)
CALCIUM SERPL-MCNC: 9.4 MG/DL (ref 8.6–10.3)
CHLORIDE BLD-SCNC: 101 MMOL/L (ref 98–107)
CHOLEST SERPL-MCNC: 169 MG/DL
CO2 SERPL-SCNC: 30 MMOL/L (ref 21–31)
CREAT SERPL-MCNC: 1.36 MG/DL (ref 0.7–1.3)
FASTING STATUS PATIENT QL REPORTED: YES
GFR SERPL CREATININE-BSD FRML MDRD: 59 ML/MIN/1.73M2
GLUCOSE BLD-MCNC: 117 MG/DL (ref 70–105)
HDLC SERPL-MCNC: 40 MG/DL (ref 23–92)
LDLC SERPL CALC-MCNC: 109 MG/DL
NONHDLC SERPL-MCNC: 129 MG/DL
POTASSIUM BLD-SCNC: 3.9 MMOL/L (ref 3.5–5.1)
PROT SERPL-MCNC: 7.6 G/DL (ref 6.4–8.9)
PSA SERPL-MCNC: 0.76 UG/L (ref 0–4)
SODIUM SERPL-SCNC: 138 MMOL/L (ref 134–144)
TRIGL SERPL-MCNC: 100 MG/DL
URATE SERPL-MCNC: 8.5 MG/DL (ref 4.4–7.6)

## 2022-05-31 PROCEDURE — 84550 ASSAY OF BLOOD/URIC ACID: CPT | Mod: ZL | Performed by: FAMILY MEDICINE

## 2022-05-31 PROCEDURE — 99396 PREV VISIT EST AGE 40-64: CPT | Performed by: FAMILY MEDICINE

## 2022-05-31 PROCEDURE — 80061 LIPID PANEL: CPT | Mod: ZL | Performed by: FAMILY MEDICINE

## 2022-05-31 PROCEDURE — 80053 COMPREHEN METABOLIC PANEL: CPT | Mod: ZL | Performed by: FAMILY MEDICINE

## 2022-05-31 PROCEDURE — 36415 COLL VENOUS BLD VENIPUNCTURE: CPT | Mod: ZL | Performed by: FAMILY MEDICINE

## 2022-05-31 PROCEDURE — G0103 PSA SCREENING: HCPCS | Mod: ZL | Performed by: FAMILY MEDICINE

## 2022-05-31 RX ORDER — EPINEPHRINE 0.3 MG/.3ML
0.3 INJECTION SUBCUTANEOUS PRN
Qty: 2 EACH | Refills: 1 | Status: SHIPPED | OUTPATIENT
Start: 2022-05-31 | End: 2022-05-31

## 2022-05-31 RX ORDER — LISINOPRIL AND HYDROCHLOROTHIAZIDE 20; 25 MG/1; MG/1
1 TABLET ORAL DAILY
Qty: 90 TABLET | Refills: 3 | Status: SHIPPED | OUTPATIENT
Start: 2022-05-31 | End: 2022-10-11

## 2022-05-31 RX ORDER — EPINEPHRINE 0.3 MG/.3ML
0.3 INJECTION SUBCUTANEOUS PRN
Qty: 4 EACH | Refills: 1 | Status: SHIPPED | OUTPATIENT
Start: 2022-05-31 | End: 2023-11-27

## 2022-05-31 RX ORDER — COVID-19 MOLECULAR TEST ASSAY
KIT MISCELLANEOUS
COMMUNITY
Start: 2021-11-23 | End: 2022-11-01

## 2022-05-31 RX ORDER — COLCHICINE 0.6 MG/1
0.6 TABLET ORAL DAILY
Qty: 30 TABLET | Refills: 1 | Status: SHIPPED | OUTPATIENT
Start: 2022-05-31 | End: 2023-11-27

## 2022-05-31 ASSESSMENT — ENCOUNTER SYMPTOMS
NAUSEA: 0
COUGH: 0
SORE THROAT: 0
HEMATOCHEZIA: 0
ABDOMINAL PAIN: 0
WEAKNESS: 0
HEARTBURN: 1
DYSURIA: 0
NERVOUS/ANXIOUS: 0
FEVER: 0
HEADACHES: 0
FREQUENCY: 1
EYE PAIN: 0
CONSTIPATION: 0
DIARRHEA: 0
DIZZINESS: 0
SHORTNESS OF BREATH: 0
PARESTHESIAS: 0
PALPITATIONS: 0
MYALGIAS: 0
CHILLS: 0
ARTHRALGIAS: 1
HEMATURIA: 0
JOINT SWELLING: 0

## 2022-05-31 ASSESSMENT — PAIN SCALES - GENERAL: PAINLEVEL: NO PAIN (0)

## 2022-05-31 NOTE — LETTER
May 31, 2022      Sean Oleary  [address]  [facility] [address]        Dear ,    We are writing to inform you of your test results.    Your uric acid level, which is a blood test for gout, is slightly elevated.  Given that this is the first flareup you have had in a couple years I would not start a medication daily to lower this but if you start to have recurrent episodes over the next few months this would be something I would consider.    Your cholesterol panel, diabetes screening a fasting glucose came back normal.    Your kidney function is stable from previous and your liver function continues to look normal.    All of your labs should be repeated again in 1 year.    Resulted Orders   Comprehensive Metabolic Panel   Result Value Ref Range    Sodium 138 134 - 144 mmol/L    Potassium 3.9 3.5 - 5.1 mmol/L    Chloride 101 98 - 107 mmol/L    Carbon Dioxide (CO2) 30 21 - 31 mmol/L    Anion Gap 7 3 - 14 mmol/L    Urea Nitrogen 16 7 - 25 mg/dL    Creatinine 1.36 (H) 0.70 - 1.30 mg/dL    Calcium 9.4 8.6 - 10.3 mg/dL    Glucose 117 (H) 70 - 105 mg/dL    Alkaline Phosphatase 61 34 - 104 U/L    AST 26 13 - 39 U/L    ALT 34 7 - 52 U/L    Protein Total 7.6 6.4 - 8.9 g/dL    Albumin 4.3 3.5 - 5.7 g/dL    Bilirubin Total 0.5 0.3 - 1.0 mg/dL    GFR Estimate 59 (L) >60 mL/min/1.73m2      Comment:      Effective December 21, 2021 eGFRcr in adults is calculated using the 2021 CKD-EPI creatinine equation which includes age and gender (Melissa gould al., NEJM, DOI: 10.1056/LHSPlr7796760)   Lipid Panel   Result Value Ref Range    Cholesterol 169 <200 mg/dL    Triglycerides 100 <150 mg/dL    Direct Measure HDL 40 23 - 92 mg/dL    LDL Cholesterol Calculated 109 (H) <=100 mg/dL    Non HDL Cholesterol 129 <130 mg/dL    Patient Fasting > 8hrs? Yes     Narrative    Cholesterol  Desirable:  <200 mg/dL    Triglycerides  Normal:  Less than 150 mg/dL  Borderline High:  150-199 mg/dL  High:  200-499 mg/dL  Very High:  Greater than or equal to 500  mg/dL    Direct Measure HDL  Female:  Greater than or equal to 50 mg/dL   Male:  Greater than or equal to 40 mg/dL    LDL Cholesterol  Desirable:  <100mg/dL  Above Desirable:  100-129 mg/dL   Borderline High:  130-159 mg/dL   High:  160-189 mg/dL   Very High:  >= 190 mg/dL    Non HDL Cholesterol  Desirable:  130 mg/dL  Above Desirable:  130-159 mg/dL  Borderline High:  160-189 mg/dL  High:  190-219 mg/dL  Very High:  Greater than or equal to 220 mg/dL   PSA Screen GH   Result Value Ref Range    Prostate Specific Antigen Screen 0.76 0.00 - 4.00 ug/L    Narrative    The DXI Access PSAS WHO assay is a two site immunoenzymatic   assay. Assay values obtained with different assay methods cannot be used   interchangeably due to differences in assay methods and reagent specificity.   Uric acid   Result Value Ref Range    Uric Acid 8.5 (H) 4.4 - 7.6 mg/dL       If you have any questions or concerns, please call the clinic at the number listed above.       Sincerely,      Jose Raul Trejo

## 2022-05-31 NOTE — PROGRESS NOTES
SUBJECTIVE:   CC: Walter Oleary is an 61 year old male who presents for preventative health visit.     He has a history of hypertension which has been well controlled.  He reports that he had a flareup of gout earlier this spring that has resolved with colchicine.  He also has had a flareup of his psoriasis on his legs but thinks that that will improve as he gets more sunshine.      Patient has been advised of split billing requirements and indicates understanding: Yes     Healthy Habits:     Getting at least 3 servings of Calcium per day:  Yes    Bi-annual eye exam:  Yes    Dental care twice a year:  NO    Sleep apnea or symptoms of sleep apnea:  Excessive snoring    Diet:  Regular (no restrictions)    Duration of exercise:  N/A    Taking medications regularly:  Yes    Medication side effects:  None    PHQ-2 Total Score: 0    Additional concerns today:  No    Today's PHQ-2 Score:   PHQ-2 ( 1999 Pf[employer]) 5/31/2022   Q1: Little interest or pleasure in doing things 0   Q2: Feeling down, depressed or hopeless 0   PHQ-2 Score 0   PHQ-2 Total Score (12-17 Years)- Positive if 3 or more points; Administer PHQ-A if positive -   Q1: Little interest or pleasure in doing things Not at all   Q2: Feeling down, depressed or hopeless Not at all   PHQ-2 Score 0       Abuse: Current or Past(Physical, Sexual or Emotional)- No  Do you feel safe in your environment? Yes        Social History     Tobacco Use     Smoking status: Passive Smoke Exposure - Never Smoker     Smokeless tobacco: Never Used   Substance Use Topics     Alcohol use: Yes     Alcohol/week: 0.0 standard drinks     Comment: Alcoholic Drinks/day: occasionally         Alcohol Use 5/31/2022   Prescreen: >3 drinks/day or >7 drinks/week? No   Prescreen: >3 drinks/day or >7 drinks/week? -       Last PSA:   PSA   Date Value Ref Range Status   02/26/2014 0.40 0 - 4 ug/L Final       Reviewed orders with patient. Reviewed health maintenance and updated orders accordingly -  Yes  Lab work is in process    Reviewed and updated as needed this visit by clinical staff   Tobacco  Allergies  Meds   Med Hx  Surg Hx  Fam Hx  Soc Hx          Reviewed and updated as needed this visit by Provider                       Review of Systems   Constitutional: Negative for chills and fever.   HENT: Negative for congestion, ear pain, hearing loss and sore throat.    Eyes: Negative for pain and visual disturbance.   Respiratory: Negative for cough and shortness of breath.    Cardiovascular: Positive for peripheral edema. Negative for chest pain and palpitations.   Gastrointestinal: Positive for heartburn. Negative for abdominal pain, constipation, diarrhea, hematochezia and nausea.   Genitourinary: Positive for frequency. Negative for dysuria, genital sores, hematuria, impotence, penile discharge and urgency.   Musculoskeletal: Positive for arthralgias. Negative for joint swelling and myalgias.   Skin: Negative for rash.   Neurological: Negative for dizziness, weakness, headaches and paresthesias.   Psychiatric/Behavioral: Negative for mood changes. The patient is not nervous/anxious.      CONSTITUTIONAL: NEGATIVE for fever, chills, change in weight  INTEGUMENTARY/SKIN: as above  EYES: NEGATIVE for vision changes or irritation  ENT: NEGATIVE for ear, mouth and throat problems  RESP: NEGATIVE for significant cough or SOB  CV: NEGATIVE for chest pain, palpitations or peripheral edema  GI: NEGATIVE for nausea, abdominal pain, heartburn, or change in bowel habits   male: negative for dysuria, hematuria, decreased urinary stream, erectile dysfunction, urethral discharge  MUSCULOSKELETAL: NEGATIVE for significant arthralgias or myalgia  NEURO: NEGATIVE for weakness, dizziness or paresthesias  PSYCHIATRIC: NEGATIVE for changes in mood or affect    OBJECTIVE:   /88 (BP Location: Right arm, Patient Position: Sitting, Cuff Size: Adult Large)   Pulse 65   Temp 98.4  F (36.9  C) (Tympanic)   Resp 16   " Ht 1.676 m (5' 6\")   Wt 112.8 kg (248 lb 9.6 oz)   SpO2 98%   BMI 40.13 kg/m      Physical Exam  GENERAL: healthy, alert and no distress  EYES: Eyes grossly normal to inspection, PERRL and conjunctivae and sclerae normal  HENT: ear canals and TM's normal, nose and mouth without ulcers or lesions  NECK: no adenopathy, no asymmetry, masses, or scars and thyroid normal to palpation  RESP: lungs clear to auscultation - no rales, rhonchi or wheezes  CV: regular rate and rhythm, normal S1 S2, no S3 or S4, no murmur, click or rub, no peripheral edema and peripheral pulses strong  ABDOMEN: soft, nontender, no hepatosplenomegaly, no masses and bowel sounds normal.  He has an obvious umbilical hernia which is nontender and easily reducible.  MS: no gross musculoskeletal defects noted, no edema  SKIN: Psoriatic lesions seen on his lower extremities which are widespread.  NEURO: Normal strength and tone, mentation intact and speech normal  PSYCH: mentation appears normal, affect normal/bright    ASSESSMENT/PLAN:       ICD-10-CM    1. Routine history and physical examination of adult  Z00.00    2. Hypertension goal BP (blood pressure) < 140/90  I10    3. Screening cholesterol level  Z13.220 Comprehensive Metabolic Panel     Lipid Panel     Comprehensive Metabolic Panel     Lipid Panel   4. Screening for prostate cancer  Z12.5 PSA Screen GH     PSA Screen GH   5. Essential (primary) hypertension  I10 lisinopril-hydrochlorothiazide (ZESTORETIC) 20-25 MG tablet   6. Bee sting allergy  Z91.030 EPINEPHrine (ANY BX GENERIC EQUIV) 0.3 MG/0.3ML injection 2-pack     DISCONTINUED: EPINEPHrine (ANY BX GENERIC EQUIV) 0.3 MG/0.3ML injection 2-pack   7. Great toe pain, right  M79.674 Uric acid     colchicine (COLCYRS) 0.6 MG tablet     Uric acid   8. Special screening for malignant neoplasms, colon  Z12.11 COLOGSANTO([employer])     We will get fasting labs today including uric acid level.  If this is significant elevated would " "consider allopurinol given his recent gout flareups.    Referral for Cologuard was completed.    Medications refilled for another year.    Patient has been advised of split billing requirements and indicates understanding: Yes    COUNSELING:   Reviewed preventive health counseling, as reflected in patient instructions       Regular exercise       Healthy diet/nutrition       Vision screening       Hearing screening       Colorectal cancer screening       Prostate cancer screening    Estimated body mass index is 40.13 kg/m  as calculated from the following:    Height as of this encounter: 1.676 m (5' 6\").    Weight as of this encounter: 112.8 kg (248 lb 9.6 oz).     Weight management plan: Discussed healthy diet and exercise guidelines    He reports that he is a non-smoker but has been exposed to tobacco smoke. He has never used smokeless tobacco.      Counseling Resources:  ATP IV Guidelines  Pooled Cohorts Equation Calculator  FRAX Risk Assessment  ICSI Preventive Guidelines  Dietary Guidelines for Americans, 2010  USDA's MyPlate  ASA Prophylaxis  Lung CA Screening    Jose Raul Trejo  [facility]  "

## 2022-05-31 NOTE — NURSING NOTE
"Chief Complaint   Patient presents with     Physical     Annual well visit       Initial /88 (BP Location: Right arm, Patient Position: Sitting, Cuff Size: Adult Large)   Pulse 65   Temp 98.4  F (36.9  C) (Tympanic)   Resp 16   Ht 1.676 m (5' 6\")   Wt 112.8 kg (248 lb 9.6 oz)   SpO2 98%   BMI 40.13 kg/m   Estimated body mass index is 40.13 kg/m  as calculated from the following:    Height as of this encounter: 1.676 m (5' 6\").    Weight as of this encounter: 112.8 kg (248 lb 9.6 oz).     Medication Reconciliation: complete      FOOD SECURITY SCREENING QUESTIONS:    The next two questions are to help us understand your food security.  If you are feeling you need any assistance in this area, we have resources available to support you today.    Hunger Vital Signs:  Within the past 12 months we worried whether our food would run out before we got money to buy more. Never  Within the past 12 months the food we bought just didn't last and we didn't have money to get more. Never      Advance care plan reviewed      Jacquie Mcfarland LPN on 5/31/2022 at 8:21 AM      "

## 2022-06-22 LAB — NONINV COLON CA DNA+OCC BLD SCRN STL QL: NEGATIVE

## 2022-10-10 ENCOUNTER — HOSPITAL ENCOUNTER (EMERGENCY)
Facility: OTHER | Age: 62
Discharge: HOME | End: 2022-10-11
Attending: INTERNAL MEDICINE | Admitting: INTERNAL MEDICINE
Payer: COMMERCIAL

## 2022-10-10 DIAGNOSIS — T46.4X5A ANGIOEDEMA DUE TO ANGIOTENSIN CONVERTING ENZYME INHIBITOR (ACE-I): Primary | ICD-10-CM

## 2022-10-10 DIAGNOSIS — I10 BENIGN ESSENTIAL HYPERTENSION: ICD-10-CM

## 2022-10-10 DIAGNOSIS — T78.3XXA ANGIOEDEMA DUE TO ANGIOTENSIN CONVERTING ENZYME INHIBITOR (ACE-I): Primary | ICD-10-CM

## 2022-10-10 PROCEDURE — 250N000011 HC RX IP 250 OP 636: Performed by: INTERNAL MEDICINE

## 2022-10-10 PROCEDURE — 99285 EMERGENCY DEPT VISIT HI MDM: CPT | Mod: 25 | Performed by: INTERNAL MEDICINE

## 2022-10-10 PROCEDURE — 96374 THER/PROPH/DIAG INJ IV PUSH: CPT | Performed by: INTERNAL MEDICINE

## 2022-10-10 PROCEDURE — 99284 EMERGENCY DEPT VISIT MOD MDM: CPT | Performed by: INTERNAL MEDICINE

## 2022-10-10 PROCEDURE — 250N000009 HC RX 250: Performed by: INTERNAL MEDICINE

## 2022-10-10 PROCEDURE — 96361 HYDRATE IV INFUSION ADD-ON: CPT | Performed by: INTERNAL MEDICINE

## 2022-10-10 PROCEDURE — 258N000003 HC RX IP 258 OP 636: Performed by: INTERNAL MEDICINE

## 2022-10-10 PROCEDURE — 96372 THER/PROPH/DIAG INJ SC/IM: CPT

## 2022-10-10 PROCEDURE — 96360 HYDRATION IV INFUSION INIT: CPT | Performed by: INTERNAL MEDICINE

## 2022-10-10 PROCEDURE — 96375 TX/PRO/DX INJ NEW DRUG ADDON: CPT | Performed by: INTERNAL MEDICINE

## 2022-10-10 RX ORDER — METHYLPREDNISOLONE SODIUM SUCCINATE 125 MG/2ML
125 INJECTION, POWDER, LYOPHILIZED, FOR SOLUTION INTRAMUSCULAR; INTRAVENOUS ONCE
Status: COMPLETED | OUTPATIENT
Start: 2022-10-10 | End: 2022-10-10

## 2022-10-10 RX ORDER — TRANEXAMIC ACID 10 MG/ML
1 INJECTION, SOLUTION INTRAVENOUS ONCE
Status: COMPLETED | OUTPATIENT
Start: 2022-10-10 | End: 2022-10-10

## 2022-10-10 RX ORDER — SODIUM CHLORIDE 9 MG/ML
INJECTION, SOLUTION INTRAVENOUS CONTINUOUS
Status: DISCONTINUED | OUTPATIENT
Start: 2022-10-10 | End: 2022-10-11 | Stop reason: HOSPADM

## 2022-10-10 RX ORDER — DIPHENHYDRAMINE HYDROCHLORIDE 50 MG/ML
50 INJECTION INTRAMUSCULAR; INTRAVENOUS ONCE
Status: COMPLETED | OUTPATIENT
Start: 2022-10-10 | End: 2022-10-10

## 2022-10-10 RX ADMIN — METHYLPREDNISOLONE SODIUM SUCCINATE 125 MG: 125 INJECTION, POWDER, FOR SOLUTION INTRAMUSCULAR; INTRAVENOUS at 17:03

## 2022-10-10 RX ADMIN — TRANEXAMIC ACID 1 G: 10 INJECTION, SOLUTION INTRAVENOUS at 18:09

## 2022-10-10 RX ADMIN — SODIUM CHLORIDE: 9 INJECTION, SOLUTION INTRAVENOUS at 17:03

## 2022-10-10 RX ADMIN — EPINEPHRINE 0.4 MG: 1 INJECTION INTRAMUSCULAR; INTRAVENOUS; SUBCUTANEOUS at 17:03

## 2022-10-10 RX ADMIN — DIPHENHYDRAMINE HYDROCHLORIDE 50 MG: 50 INJECTION, SOLUTION INTRAMUSCULAR; INTRAVENOUS at 17:03

## 2022-10-10 ASSESSMENT — ACTIVITIES OF DAILY LIVING (ADL)
ADLS_ACUITY_SCORE: 37

## 2022-10-10 NOTE — ED PROVIDER NOTES
Emergency Department Provider Note  : 1960 Age: 61 year old Sex: male MRN: [id number]    Chief Complaint   Patient presents with     Oral Swelling       Medical Decision Making / Assessment / Plan   61 year old male presenting with ACE inhibitor induced angioedema    ED Course as of 10/10/22 2233   Mon Oct 10, 2022   165 Patient evaluated.  Walking in with tongue swelling and feels like his throat is getting thick or starting to get some mild difficulty breathing.   Symptoms just started acutely just before arrival.    Patient brought to emergency room immediately.  IV placed.  Cardiac monitor applied.  IM epinephrine 0.4 mg administered.  After IV placed, 50 mg IV Benadryl and 125 mg Solu-Medrol administered via IV.    He is able to breathe through his nose.  Feels like the tightness in his throat is starting to improve.    Patient does have bee sting allergy and has EpiPen at home but did not administer prior to arrival.  Currently taking lisinopril for hypertension.   170 Patient had minimal to no improvement with above interventions.   Suspect bradykinin induced or ACE inhibitor induced angioedema.  Plan to treat with 1 g IV TXA.    TXA has nearly completed infusion.  He is starting to notice improvement in his speech and tongue swelling.    Patient indicates symptoms are improving.  Now has about 40 to 50% less swelling of the tongue.    At this point, continue to monitor.  Consider repeat dosing of 1 g TXA after 4 hours (Approx 10 PM).    Tongue swelling now improved to 80+ percent.  Speech is much easier to understand.  He can now lift his lips and swallow much easier.    He is hungry, managing saliva well at this time.   Plan to monitor overnight for possible rebound angioedema.    Patient is up and ambulatory.  Feels like his tongue is doing quite a bit better.  No need for repeat TXA treatment at this time.  Continue to monitor overnight for possible rebound symptoms.    2230 -- Planning repeat.  Discontinue lisinopril and hydrochlorothiazide.    -- Consider starting irbesartan in its place at ER discharge.       Discharge and disposition pending at time of routine change of shift.    New Prescriptions    No medications on file       Final diagnoses:   Angioedema due to angiotensin converting enzyme inhibitor (ACE-I)   Benign essential hypertension       Oscar Henderson MD  10/10/2022   Emergency Department    Lupe Watson is a 61 year old male who presents at  4:48 PM with tongue and throat swelling that started acutely prior to emergency room arrival.  He has bee sting allergy at baseline.  Has EpiPen at home but did not use it prior to emergency room.  Symptoms are worsening.  He is still able to breathe through his nose.  He is having some difficulty trying to manage his secretions and his tongue is getting quite large.    Otherwise doing well.  Denies fevers or chills.  No rashes or sores.  No abdominal pain.  No chest pain.  No recent changes in bowel or bladder habits.    No hives.  No itching.  Basically symptoms are isolated to the tongue/mouth.    I have reviewed the Medications, Allergies, Past Medical and Surgical History, and Social History in the [facility] and with family.    Review of Systems:  Please see Subjective / HPI for pertinent positives and negatives. All other systems reviewed and found to be negative.      Objective     Patient Vitals for the past 24 hrs:   BP Temp Temp src Pulse Resp SpO2 Weight   10/10/22 2200 (!) 143/85 -- -- 60 20 93 % --   10/10/22 2130 (!) 138/95 -- -- 63 19 95 % --   10/10/22 2120 (!) 144/94 -- -- 59 -- 95 % --   10/10/22 2030 105/72 -- -- 64 15 94 % --   10/10/22 2000 120/72 -- -- 58 10 95 % --   10/10/22 1930 119/73 -- -- 68 10 95 % --   10/10/22 1900 -- -- -- 76 -- 95 % --   10/10/22 1845 115/70 -- -- 67 17 95 % --   10/10/22 1830 129/65 -- -- 66 8 94 % --   10/10/22 1815 121/64 -- -- 67 13 99 % --   10/10/22 1800 116/63  -- -- 73 18 96 % --   10/10/22 1745 119/68 -- -- 66 19 97 % --   10/10/22 1730 129/77 -- -- 72 15 98 % --   10/10/22 1715 119/86 -- -- 70 16 97 % --   10/10/22 1701 (!) 108/92 97.2  F (36.2  C) Tympanic 87 18 96 % 112.5 kg (248 lb)       Physical Exam:   General: Awake, alert, in no acute distress.  Talking is slurred and somewhat mumbled due to asymmetric thickened and swollen tongue (right side of his tongue is very swollen, left side is minimally swollen)  Head: Normocephalic, atraumatic.  Eyes: Conjugate gaze.  ENT: Moist membranes, thickened and swollen tongue especially right-sided.  External ear appears normal.   Chest/Respiratory: Equal chest rise, no wheezes.  Clear bilaterally..  Cardiovascular: Peripheral pulses present, regular rate and rhythm.  No murmurs.  Abdominal: Soft, non-distended, non-tender.  Extremities: No obvious deformity.  Neurological: GCS 15, moving all extremities without gross deficit.  Skin: Warm, no rashes, lesions, or bruising.   Psychiatric: Appropriate affect.     Procedures / Critical Care   Procedures    Critical Care Time: None.     Orders Placed This Encounter   Procedures     Peripheral IV catheter     Vital signs     Pulse oximetry nursing     Cardiac Continuous Monitoring     Oxygen: Nasal cannula, Oxygen mask       RESULTS:  No results found for any visits on 10/10/22.        Medical/Surgical History:  Past Medical History:   Diagnosis Date     Anaphylactic shock     9/15/2016     Complex tear of medial meniscus of right knee as current injury     No Comments Provided     Other specified postprocedural states     12/7/2016     S/P total hip arthroplasty, right 3/10/2015     Past Surgical History:   Procedure Laterality Date     ABSTRACT COLOGSANTO-NO CHARGE  05/2019    normal     [facility] TOTAL HIP ARTHROPLASTY Bilateral 2015    [place]       Medications:  Current Facility-Administered Medications   Medication     sodium chloride 0.9% infusion     Current Outpatient  Medications   Medication     aspirin (GOODSENSE ASPIRIN) 325 MG tablet     colchicine (COLCYRS) 0.6 MG tablet     diphenhydrAMINE (BENADRYL) 25 MG capsule     EPINEPHrine (ANY BX GENERIC EQUIV) 0.3 MG/0.3ML injection 2-pack     ID NOW COVID-19 KIT     lisinopril-hydrochlorothiazide (ZESTORETIC) 20-25 MG tablet       Allergies:  Lisinopril, Wasp venom protein, and No known drug allergies    Relevant labs, images, EKGs, Epic and outside hospital (if applicable) charts were reviewed. The findings, diagnosis, plan, and need for follow up were discussed with the patient/family. Nursing notes were reviewed.      Oscar Henderson MD  10/10/22 9457  CONTINUATION OF CARE  Patient feeling much better over the course of his ED stay, he feels like he is back to baseline.  Per discussion earlier with Dr. Henderson at signout we will discontinue the patient's lisinopril and trial him on Avapro instead.  Patient's symptoms could still rebound but clinical trajectory here in the ED is very reassuring.  Patient wants to go home.  Recommend close follow-up with primary, return to the emergency department recurrent symptoms or worsening symptoms.  Patient verbalized understanding plans agreement left ED improving condition.       Alfred Benavides MD  10/11/22 1370

## 2022-10-10 NOTE — ED TRIAGE NOTES
ED Nursing Triage Note (General)   ________________________________    Walter Oleary is a 61 year old Male that presents to triage via priate vehicle with his wife with complaints of oral swelling which began 30 minutes prior to arrival.  Patient states he is on lisinopril and has been for 14 years.  Provider was immediately notified of patients arrival. Breathing is spontaneous and lungs are clear on arrival.   Significant symptoms had onset 30 minutes ago.  Patient appears alert behavior.  GCS-15  Airway: intact  Breathing noted as Normal  Action taken: 2      PRE HOSPITAL PRIOR LIVING SITUATION-home

## 2022-10-11 VITALS
SYSTOLIC BLOOD PRESSURE: 134 MMHG | TEMPERATURE: 97.2 F | DIASTOLIC BLOOD PRESSURE: 85 MMHG | WEIGHT: 248 LBS | RESPIRATION RATE: 17 BRPM | BODY MASS INDEX: 40.03 KG/M2 | OXYGEN SATURATION: 95 % | HEART RATE: 77 BPM

## 2022-10-11 PROCEDURE — 96361 HYDRATE IV INFUSION ADD-ON: CPT | Performed by: INTERNAL MEDICINE

## 2022-10-11 RX ORDER — IRBESARTAN AND HYDROCHLOROTHIAZIDE 150; 12.5 MG/1; MG/1
1 TABLET, FILM COATED ORAL DAILY
Qty: 15 TABLET | Refills: 0 | Status: SHIPPED | OUTPATIENT
Start: 2022-10-11 | End: 2022-10-14

## 2022-10-11 ASSESSMENT — ACTIVITIES OF DAILY LIVING (ADL)
ADLS_ACUITY_SCORE: 37

## 2022-10-11 NOTE — ED NOTES
Pt's VS are WNL.  He was able to ambulate to the bathroom without difficulty.  Tongue is still swollen on the left side.  Speech is slurred.  He is able to swallow water at this time.  Lung sounds are clear.  Wife is at bedside.  Mylene Chase RN on 10/10/2022 at 7:08 PM

## 2022-10-11 NOTE — ED NOTES
Pt eating an egg salad sandwich at this time.  He is taking smaller bites, tolerating ok.  V Ital signs remain stable.  In no acute distress.

## 2022-10-11 NOTE — ED NOTES
Pt is resting comfortably at this time. He tongue is less swollen on the right side as it was an hour earlier.  VS are stable.  Patient would like to discharge at this time.  Provider notified.  Mylene Chase RN on 10/10/2022 at 8:22 PM

## 2022-10-11 NOTE — ED NOTES
Pt will be staying the night in the ER to monitor.  Moved to [place] [address].  [place] and drink given to patient.  He is resting comfortably at this time.  No difficulty breathing or swallowing.    Mylene Chase RN on 10/10/2022 at 9:03 PM

## 2022-10-11 NOTE — ED NOTES
Pt up to the bathroom.  He states that his speech is back to baseline.  Tongue swelling has resolved at this time.  His tongue is pink and symmetrical.  Pt states that he is ready to discharge.  Provider notified.    Mylene Chase RN on 10/11/2022 at 6:10 AM

## 2022-10-11 NOTE — ED NOTES
Pt up to the bathroom.  Speech is clearer.  Tongue is still swollen but swelling continues to lessen.  Was able to eat a sandwich.  Oxygen level is currently 95% RA.  No difficulty breathing.  Pt is ready for bed.  No night time medications due.  Mylene Chase RN on 10/10/2022 at 11:09 PM

## 2022-10-11 NOTE — ED NOTES
Last BP was 79/54.  Pt was sound asleep.  Easily arousalable.  Pt denies dizziness or lightheadedness when he got up to ambulate to the bathroom.  Swelling continues to decrease in his tongue  States that his speech is close to normal.  Will continue to monitor. Mylene Chase RN on 10/11/2022 at 2:19 AM

## 2022-10-14 ENCOUNTER — OFFICE VISIT (OUTPATIENT)
Dept: FAMILY MEDICINE | Facility: OTHER | Age: 62
End: 2022-10-14
Attending: FAMILY MEDICINE
Payer: COMMERCIAL

## 2022-10-14 VITALS
DIASTOLIC BLOOD PRESSURE: 86 MMHG | BODY MASS INDEX: 38.44 KG/M2 | RESPIRATION RATE: 15 BRPM | WEIGHT: 239.2 LBS | OXYGEN SATURATION: 98 % | HEIGHT: 66 IN | TEMPERATURE: 97.3 F | SYSTOLIC BLOOD PRESSURE: 130 MMHG | HEART RATE: 69 BPM

## 2022-10-14 DIAGNOSIS — K13.0 LIP LESION: ICD-10-CM

## 2022-10-14 DIAGNOSIS — T78.3XXD ANGIOEDEMA, SUBSEQUENT ENCOUNTER: Primary | ICD-10-CM

## 2022-10-14 DIAGNOSIS — I10 HYPERTENSION GOAL BP (BLOOD PRESSURE) < 140/90: ICD-10-CM

## 2022-10-14 PROCEDURE — 99213 OFFICE O/P EST LOW 20 MIN: CPT | Performed by: FAMILY MEDICINE

## 2022-10-14 PROCEDURE — G0463 HOSPITAL OUTPT CLINIC VISIT: HCPCS

## 2022-10-14 RX ORDER — IRBESARTAN AND HYDROCHLOROTHIAZIDE 150; 12.5 MG/1; MG/1
1 TABLET, FILM COATED ORAL DAILY
Qty: 90 TABLET | Refills: 3 | Status: SHIPPED | OUTPATIENT
Start: 2022-10-14 | End: 2022-11-01

## 2022-10-14 ASSESSMENT — PAIN SCALES - GENERAL: PAINLEVEL: NO PAIN (0)

## 2022-10-14 NOTE — NURSING NOTE
"Chief Complaint   Patient presents with     Hospital F/U     ER follow up-Angioedema due to ACE-1     Pt here for ER follow up. Angioedema due to   Lisinopril    Initial /86 (BP Location: Right arm, Patient Position: Sitting, Cuff Size: Adult Large)   Pulse 69   Temp 97.3  F (36.3  C) (Tympanic)   Resp 15   Ht 1.676 m (5' 6\")   Wt 108.5 kg (239 lb 3.2 oz)   SpO2 98%   BMI 38.61 kg/m   Estimated body mass index is 38.61 kg/m  as calculated from the following:    Height as of this encounter: 1.676 m (5' 6\").    Weight as of this encounter: 108.5 kg (239 lb 3.2 oz).         FOOD SECURITY SCREENING QUESTIONS:    The next two questions are to help us understand your food security.  If you are feeling you need any assistance in this area, we have resources available to support you today.    Hunger Vital Signs:  Within the past 12 months we worried whether our food would run out before we got money to buy more. Never  Within the past 12 months the food we bought just didn't last and we didn't have money to get more. Never        Kathe Simons LPN   "

## 2022-10-14 NOTE — PROGRESS NOTES
"  Assessment & Plan     Angioedema, subsequent encounter  Seems to have resolved.  Likely related to ACE inhibitor.    Hypertension goal BP (blood pressure) < 140/90  Blood pressures controlled with new regimen.  We will follow-up next week for repeat basic metabolic panel.  - irbesartan-hydrochlorothiazide (AVALIDE) 150-12.5 MG tablet; Take 1 tablet by mouth daily  - Basic Metabolic Panel; Future    Lip lesion  Refer to general surgery for consideration of biopsy.  - Adult General Surg Referral; Future                 No follow-ups on file.    Jose Raul Trejo  [facility]    Subjective   Sean is a 61 year old, presenting for the following health issues:  Hospital F/U (ER follow up-Angioedema due to ACE-1)    He was seen emergency room in October 10 for tongue swelling and feeling like his throat was swelling and having some difficulty breathing.  He was given epinephrine, Benadryl and Solu-Medrol.  He did not have significant provement with initial treatment so he was given TXA which did provide some improvement.  It was felt that this potentially was related to lisinopril use which he has used chronically.  He has since stopped lisinopril.  His blood pressure has been well controlled.    He also has had several lesions on his lower lip for the past 3 to 4 months.  No history of tobacco use.    HPI           Review of Systems         Objective    /86 (BP Location: Right arm, Patient Position: Sitting, Cuff Size: Adult Large)   Pulse 69   Temp 97.3  F (36.3  C) (Tympanic)   Resp 15   Ht 1.676 m (5' 6\")   Wt 108.5 kg (239 lb 3.2 oz)   SpO2 98%   BMI 38.61 kg/m    Body mass index is 38.61 kg/m .  Physical Exam   GENERAL: healthy, alert and no distress  HENT: oropharynx clear, oral mucous membranes moist.  Hypopigmented lesion, 1cm on lower central lip.                    "

## 2022-10-31 ENCOUNTER — HOSPITAL ENCOUNTER (EMERGENCY)
Facility: OTHER | Age: 62
Discharge: HOME | End: 2022-10-31
Attending: EMERGENCY MEDICINE | Admitting: EMERGENCY MEDICINE
Payer: COMMERCIAL

## 2022-10-31 VITALS
BODY MASS INDEX: 37.77 KG/M2 | WEIGHT: 235 LBS | DIASTOLIC BLOOD PRESSURE: 102 MMHG | TEMPERATURE: 97.3 F | HEIGHT: 66 IN | HEART RATE: 53 BPM | OXYGEN SATURATION: 96 % | SYSTOLIC BLOOD PRESSURE: 129 MMHG | RESPIRATION RATE: 14 BRPM

## 2022-10-31 DIAGNOSIS — T78.3XXD ANGIOEDEMA, SUBSEQUENT ENCOUNTER: ICD-10-CM

## 2022-10-31 LAB
ALBUMIN SERPL-MCNC: 4.1 G/DL (ref 3.5–5.7)
ALP SERPL-CCNC: 70 U/L (ref 34–104)
ALT SERPL W P-5'-P-CCNC: 31 U/L (ref 7–52)
ANION GAP SERPL CALCULATED.3IONS-SCNC: 10 MMOL/L (ref 3–14)
AST SERPL W P-5'-P-CCNC: 24 U/L (ref 13–39)
BASOPHILS # BLD AUTO: 0 10E3/UL (ref 0–0.2)
BASOPHILS NFR BLD AUTO: 1 %
BILIRUB SERPL-MCNC: 0.6 MG/DL (ref 0.3–1)
BUN SERPL-MCNC: 17 MG/DL (ref 7–25)
CALCIUM SERPL-MCNC: 9.3 MG/DL (ref 8.6–10.3)
CHLORIDE BLD-SCNC: 103 MMOL/L (ref 98–107)
CO2 SERPL-SCNC: 25 MMOL/L (ref 21–31)
CREAT SERPL-MCNC: 1.32 MG/DL (ref 0.7–1.3)
EOSINOPHIL # BLD AUTO: 0.1 10E3/UL (ref 0–0.7)
EOSINOPHIL NFR BLD AUTO: 2 %
ERYTHROCYTE [DISTWIDTH] IN BLOOD BY AUTOMATED COUNT: 14 % (ref 10–15)
GFR SERPL CREATININE-BSD FRML MDRD: 61 ML/MIN/1.73M2
GLUCOSE BLD-MCNC: 105 MG/DL (ref 70–105)
HCT VFR BLD AUTO: 44.5 % (ref 40–53)
HGB BLD-MCNC: 15.2 G/DL (ref 13.3–17.7)
HOLD SPECIMEN: NORMAL
HOLD SPECIMEN: NORMAL
IMM GRANULOCYTES # BLD: 0 10E3/UL
IMM GRANULOCYTES NFR BLD: 0 %
LYMPHOCYTES # BLD AUTO: 1.1 10E3/UL (ref 0.8–5.3)
LYMPHOCYTES NFR BLD AUTO: 23 %
MCH RBC QN AUTO: 30.6 PG (ref 26.5–33)
MCHC RBC AUTO-ENTMCNC: 34.2 G/DL (ref 31.5–36.5)
MCV RBC AUTO: 90 FL (ref 78–100)
MONOCYTES # BLD AUTO: 0.5 10E3/UL (ref 0–1.3)
MONOCYTES NFR BLD AUTO: 10 %
NEUTROPHILS # BLD AUTO: 3 10E3/UL (ref 1.6–8.3)
NEUTROPHILS NFR BLD AUTO: 64 %
NRBC # BLD AUTO: 0 10E3/UL
NRBC BLD AUTO-RTO: 0 /100
PLATELET # BLD AUTO: 186 10E3/UL (ref 150–450)
POTASSIUM BLD-SCNC: 4.1 MMOL/L (ref 3.5–5.1)
PROT SERPL-MCNC: 7.1 G/DL (ref 6.4–8.9)
RBC # BLD AUTO: 4.96 10E6/UL (ref 4.4–5.9)
SODIUM SERPL-SCNC: 138 MMOL/L (ref 134–144)
WBC # BLD AUTO: 4.7 10E3/UL (ref 4–11)

## 2022-10-31 PROCEDURE — 99284 EMERGENCY DEPT VISIT MOD MDM: CPT | Performed by: EMERGENCY MEDICINE

## 2022-10-31 PROCEDURE — 250N000009 HC RX 250: Performed by: EMERGENCY MEDICINE

## 2022-10-31 PROCEDURE — 96374 THER/PROPH/DIAG INJ IV PUSH: CPT

## 2022-10-31 PROCEDURE — 250N000011 HC RX IP 250 OP 636: Performed by: EMERGENCY MEDICINE

## 2022-10-31 PROCEDURE — 82040 ASSAY OF SERUM ALBUMIN: CPT | Performed by: EMERGENCY MEDICINE

## 2022-10-31 PROCEDURE — 80053 COMPREHEN METABOLIC PANEL: CPT | Performed by: EMERGENCY MEDICINE

## 2022-10-31 PROCEDURE — 96375 TX/PRO/DX INJ NEW DRUG ADDON: CPT

## 2022-10-31 PROCEDURE — 85025 COMPLETE CBC W/AUTO DIFF WBC: CPT | Performed by: EMERGENCY MEDICINE

## 2022-10-31 PROCEDURE — 36415 COLL VENOUS BLD VENIPUNCTURE: CPT | Performed by: EMERGENCY MEDICINE

## 2022-10-31 PROCEDURE — 99284 EMERGENCY DEPT VISIT MOD MDM: CPT | Mod: 25

## 2022-10-31 RX ORDER — TRANEXAMIC ACID 10 MG/ML
1 INJECTION, SOLUTION INTRAVENOUS ONCE
Status: COMPLETED | OUTPATIENT
Start: 2022-10-31 | End: 2022-10-31

## 2022-10-31 RX ORDER — HYDROCHLOROTHIAZIDE 25 MG/1
25 TABLET ORAL DAILY
Qty: 30 TABLET | Refills: 0 | Status: SHIPPED | OUTPATIENT
Start: 2022-10-31 | End: 2022-11-29

## 2022-10-31 RX ORDER — METHYLPREDNISOLONE SODIUM SUCCINATE 125 MG/2ML
125 INJECTION, POWDER, LYOPHILIZED, FOR SOLUTION INTRAMUSCULAR; INTRAVENOUS ONCE
Status: COMPLETED | OUTPATIENT
Start: 2022-10-31 | End: 2022-10-31

## 2022-10-31 RX ORDER — DIPHENHYDRAMINE HYDROCHLORIDE 50 MG/ML
25 INJECTION INTRAMUSCULAR; INTRAVENOUS ONCE
Status: COMPLETED | OUTPATIENT
Start: 2022-10-31 | End: 2022-10-31

## 2022-10-31 RX ADMIN — TRANEXAMIC ACID 1 G: 10 INJECTION, SOLUTION INTRAVENOUS at 11:33

## 2022-10-31 RX ADMIN — DIPHENHYDRAMINE HYDROCHLORIDE 25 MG: 50 INJECTION, SOLUTION INTRAMUSCULAR; INTRAVENOUS at 09:22

## 2022-10-31 RX ADMIN — METHYLPREDNISOLONE SODIUM SUCCINATE 125 MG: 125 INJECTION, POWDER, FOR SOLUTION INTRAMUSCULAR; INTRAVENOUS at 09:22

## 2022-10-31 ASSESSMENT — ENCOUNTER SYMPTOMS
CHILLS: 0
AGITATION: 0
ARTHRALGIAS: 0
CHEST TIGHTNESS: 0
TROUBLE SWALLOWING: 0
SHORTNESS OF BREATH: 0
FEVER: 0
VOICE CHANGE: 0
FACIAL SWELLING: 1
VOMITING: 0
LIGHT-HEADEDNESS: 0
NAUSEA: 0
DYSURIA: 0

## 2022-10-31 ASSESSMENT — ACTIVITIES OF DAILY LIVING (ADL)
ADLS_ACUITY_SCORE: 37
ADLS_ACUITY_SCORE: 37

## 2022-10-31 NOTE — ED PROVIDER NOTES
History     Chief Complaint   Patient presents with     Allergic Reaction     Facial Swelling     HPI  Walter Oleary is a 61 year old male who was seen here in the emergency department on October 10 and diagnosed with likely aced angioedema.  He was here for an extended stay received epinephrine Benadryl steroids and TXA and improved.  Discontinued his lisinopril.  He has since been on irbesartan hydrochlorothiazide.  This morning he woke up and noticed some numbness in the right side of his face, swelling in his upper lip.  He has some pressure in his nose area.  He is now starting to feel some pain under his tongue, however at this point denies any swelling in his tongue.  His voice sounds the same to him.    Allergies:  Allergies   Allergen Reactions     Lisinopril      Oral swelling     Wasp Venom Protein Hives     Other reaction(s): Angioedema     No Known Drug Allergies        Problem List:    Patient Active Problem List    Diagnosis Date Noted     Angioedema due to angiotensin converting enzyme inhibitor (ACE-I) 10/10/2022     Priority: Medium     Obesity (BMI 35.0-39.9) with comorbidity (H) 04/23/2019     Priority: Medium     Bee sting allergy 02/21/2017     Priority: Medium     Psoriasis 11/18/2016     Priority: Medium     Benign essential hypertension 09/15/2016     Priority: Medium     Hypertension goal BP (blood pressure) < 140/90 09/02/2011     Priority: Medium        Past Medical History:    Past Medical History:   Diagnosis Date     Anaphylactic shock      Complex tear of medial meniscus of right knee as current injury      Other specified postprocedural states      S/P total hip arthroplasty, right 3/10/2015       Past Surgical History:    Past Surgical History:   Procedure Laterality Date     ABSTRACT HEAVEN-NO CHARGE  05/2019    normal     [facility] TOTAL HIP ARTHROPLASTY Bilateral 2015    [place]       Family History:    No family history on file.    Social History:  Marital Status:    "[2]  Social History     Tobacco Use     Smoking status: Never     Passive exposure: Yes     Smokeless tobacco: Never   Vaping Use     Vaping Use: Never used   Substance Use Topics     Alcohol use: Not Currently     Comment: Alcoholic Drinks/day: occasionally     Drug use: Never        Medications:    hydrochlorothiazide (HYDRODIURIL) 25 MG tablet  irbesartan-hydrochlorothiazide (AVALIDE) 150-12.5 MG tablet  aspirin (ASA) 325 MG tablet  colchicine (COLCYRS) 0.6 MG tablet  diphenhydrAMINE (BENADRYL) 25 MG capsule  EPINEPHrine (ANY BX GENERIC EQUIV) 0.3 MG/0.3ML injection 2-pack  ID NOW COVID-19 KIT          Review of Systems   Constitutional: Negative for chills and fever.   HENT: Positive for facial swelling. Negative for congestion, trouble swallowing and voice change.    Eyes: Negative for visual disturbance.   Respiratory: Negative for chest tightness and shortness of breath.    Cardiovascular: Negative for chest pain.   Gastrointestinal: Negative for nausea and vomiting.   Genitourinary: Negative for dysuria.   Musculoskeletal: Negative for arthralgias.   Skin: Negative for rash.   Neurological: Negative for light-headedness.   Psychiatric/Behavioral: Negative for agitation.       Physical Exam   BP: (!) 183/106  Pulse: 62  Temp: 97.3  F (36.3  C)  Resp: 16  Height: 167.6 cm (5' 6\")  Weight: 106.6 kg (235 lb)  SpO2: 95 %      Physical Exam  Vitals and nursing note reviewed.   Constitutional:       Appearance: Normal appearance.   HENT:      Head: Normocephalic and atraumatic.      Comments: There is some visible swelling to the right upper lip.     Mouth/Throat:      Mouth: Mucous membranes are moist.      Comments: No swelling to tongue  Eyes:      Conjunctiva/sclera: Conjunctivae normal.   Cardiovascular:      Rate and Rhythm: Normal rate and regular rhythm.      Heart sounds: Normal heart sounds.   Pulmonary:      Effort: Pulmonary effort is normal.      Breath sounds: Normal breath sounds.   Abdominal:      " General: Abdomen is flat. Bowel sounds are normal.   Skin:     General: Skin is warm and dry.   Neurological:      Mental Status: He is alert and oriented to person, place, and time.   Psychiatric:         Behavior: Behavior normal.         ED Course                 Procedures                  Results for orders placed or performed during the hospital encounter of 10/31/22 (from the past 24 hour(s))   CBC with platelets differential    Narrative    The following orders were created for panel order CBC with platelets differential.  Procedure                               Abnormality         Status                     ---------                               -----------         ------                     CBC with platelets and d...[[id number]]                      Final result                 Please view results for these tests on the individual orders.   Comprehensive metabolic panel   Result Value Ref Range    Sodium 138 134 - 144 mmol/L    Potassium 4.1 3.5 - 5.1 mmol/L    Chloride 103 98 - 107 mmol/L    Carbon Dioxide (CO2) 25 21 - 31 mmol/L    Anion Gap 10 3 - 14 mmol/L    Urea Nitrogen 17 7 - 25 mg/dL    Creatinine 1.32 (H) 0.70 - 1.30 mg/dL    Calcium 9.3 8.6 - 10.3 mg/dL    Glucose 105 70 - 105 mg/dL    Alkaline Phosphatase 70 34 - 104 U/L    AST 24 13 - 39 U/L    ALT 31 7 - 52 U/L    Protein Total 7.1 6.4 - 8.9 g/dL    Albumin 4.1 3.5 - 5.7 g/dL    Bilirubin Total 0.6 0.3 - 1.0 mg/dL    GFR Estimate 61 >60 mL/min/1.73m2   Extra Tube    Narrative    The following orders were created for panel order Extra Tube.  Procedure                               Abnormality         Status                     ---------                               -----------         ------                     Extra Blue Top Tube[[id number]]                              Final result               Extra Serum Separator Tu...[[id number]]                      Final result                 Please view results for these tests on the individual  orders.   Extra Blue Top Tube   Result Value Ref Range    Hold Specimen jic    Extra Serum Separator Tube (SST)   Result Value Ref Range    Hold Specimen jic    CBC with platelets and differential   Result Value Ref Range    WBC Count 4.7 4.0 - 11.0 10e3/uL    RBC Count 4.96 4.40 - 5.90 10e6/uL    Hemoglobin 15.2 13.3 - 17.7 g/dL    Hematocrit 44.5 40.0 - 53.0 %    MCV 90 78 - 100 fL    MCH 30.6 26.5 - 33.0 pg    MCHC 34.2 31.5 - 36.5 g/dL    RDW 14.0 10.0 - 15.0 %    Platelet Count 186 150 - 450 10e3/uL    % Neutrophils 64 %    % Lymphocytes 23 %    % Monocytes 10 %    % Eosinophils 2 %    % Basophils 1 %    % Immature Granulocytes 0 %    NRBCs per 100 WBC 0 <1 /100    Absolute Neutrophils 3.0 1.6 - 8.3 10e3/uL    Absolute Lymphocytes 1.1 0.8 - 5.3 10e3/uL    Absolute Monocytes 0.5 0.0 - 1.3 10e3/uL    Absolute Eosinophils 0.1 0.0 - 0.7 10e3/uL    Absolute Basophils 0.0 0.0 - 0.2 10e3/uL    Absolute Immature Granulocytes 0.0 <=0.4 10e3/uL    Absolute NRBCs 0.0 10e3/uL       Medications   diphenhydrAMINE (BENADRYL) injection 25 mg (25 mg Intravenous Given 10/31/22 0922)   methylPREDNISolone sodium succinate (solu-MEDROL) injection 125 mg (125 mg Intravenous Given 10/31/22 0922)   tranexamic acid 1 g in 100 mL NS IV bag (premix) (1 g Intravenous Given 10/31/22 1133)       Assessments & Plan (with Medical Decision Making)     I have reviewed the nursing notes.    I have reviewed the findings, diagnosis, plan and need for follow up with the patient.  Patient initially given Benadryl and Solu-Medrol.  He did have some very slight improvement after an hour or so with that but it was pretty minimal.  He was therefore given 1 g of TXA and he felt like that made significant difference.  He still has a little bit of swelling in the right upper lip but quite minimal.  He never had any swelling in his tongue, difficulty swallowing or speaking.  I am going to have him stop his irbesartan hydrochlorothiazide for now.  We will  give him prescription for 25 mg of hydrochlorothiazide for now as well, but asked him to follow-up with his primary provider to discuss how they would like to proceed from here.  Return here if at all worse.    New Prescriptions    HYDROCHLOROTHIAZIDE (HYDRODIURIL) 25 MG TABLET    Take 1 tablet (25 mg) by mouth daily       Final diagnoses:   Angioedema, subsequent encounter       10/31/2022   [facility] [place]     Walter Medel MD  10/31/22 0616

## 2022-10-31 NOTE — ED TRIAGE NOTES
Pt is here today with facial swelling.  He was seen here on 10/10 for severe tongue swelling.  It was believed it was from his lisinopril which was discontinued.  He is now on Irbesartan/HTCZ 150-12.5mg.  Started on 10/24/2022.  He currently has swelling his his right lip and feels pressure up into his nose.  No tongue at this time.  He took 50 mg of oral benadryl at 0800    Mylene Chase RN on 10/31/2022 at 9:03 AM       Triage Assessment     Row Name 10/31/22 0859       Triage Assessment (Adult)    Airway WDL X    Additional Documentation Edema (Group)       Respiratory WDL    Respiratory WDL WDL       Skin Circulation/Temperature WDL    Skin Circulation/Temperature WDL X  Swollen lips       Cardiac WDL    Cardiac WDL X  HTN       Peripheral/Neurovascular WDL    Peripheral Neurovascular WDL WDL       Edema    Edema face    Face Edema 2+ (Mild)       Cognitive/Neuro/Behavioral WDL    Cognitive/Neuro/Behavioral WDL WDL       Tereso Coma Scale    Best Eye Response 4-->(E4) spontaneous    Best Motor Response 6-->(M6) obeys commands    Best Verbal Response 5-->(V5) oriented    Tereso Coma Scale Score 15

## 2022-10-31 NOTE — ED NOTES
No change is respiratory status.  Facial swelling is stable.    Mylene Chase RN on 10/31/2022 at 9:38 AM

## 2022-10-31 NOTE — DISCHARGE INSTRUCTIONS
Stop your irbesartan hydrochlorothiazide, and  a prescription for just hydrochlorothiazide.  Call your regular provider and talk to them about how to proceed from here.  Return if worse

## 2022-11-01 ENCOUNTER — OFFICE VISIT (OUTPATIENT)
Dept: FAMILY MEDICINE | Facility: OTHER | Age: 62
End: 2022-11-01
Attending: FAMILY MEDICINE
Payer: COMMERCIAL

## 2022-11-01 VITALS
OXYGEN SATURATION: 98 % | DIASTOLIC BLOOD PRESSURE: 94 MMHG | HEART RATE: 64 BPM | RESPIRATION RATE: 20 BRPM | HEIGHT: 66 IN | WEIGHT: 243 LBS | BODY MASS INDEX: 39.05 KG/M2 | TEMPERATURE: 97.1 F | SYSTOLIC BLOOD PRESSURE: 134 MMHG

## 2022-11-01 DIAGNOSIS — I10 HYPERTENSION GOAL BP (BLOOD PRESSURE) < 140/90: ICD-10-CM

## 2022-11-01 DIAGNOSIS — T78.3XXD ANGIOEDEMA, SUBSEQUENT ENCOUNTER: Primary | ICD-10-CM

## 2022-11-01 PROCEDURE — 99213 OFFICE O/P EST LOW 20 MIN: CPT | Performed by: FAMILY MEDICINE

## 2022-11-01 PROCEDURE — G0463 HOSPITAL OUTPT CLINIC VISIT: HCPCS

## 2022-11-01 RX ORDER — DIPHENHYD/PHENYLEPH/ACETAMINOP 12.5-5-325
TABLET ORAL
Qty: 1 KIT | Refills: 0 | Status: SHIPPED | OUTPATIENT
Start: 2022-11-01

## 2022-11-01 ASSESSMENT — PAIN SCALES - GENERAL: PAINLEVEL: NO PAIN (0)

## 2022-11-01 ASSESSMENT — PATIENT HEALTH QUESTIONNAIRE - PHQ9: SUM OF ALL RESPONSES TO PHQ QUESTIONS 1-9: 0

## 2022-11-01 NOTE — PROGRESS NOTES
"  Assessment & Plan     Angioedema, subsequent encounter  His exam has normalized.  He has since stopped irbesartan.  We will refer him to allergist given 2 of these episodes in the past couple of weeks.  - Adult Allergy/Asthma Referral; Future    Hypertension goal BP (blood pressure) < 140/90  Blood pressure is acceptable today.  We will start checking blood pressure at home and follow-up in 1 month for reassessment.  Yesterday labs are drawn the ED, these were reviewed and are stable.  - Blood Pressure Monitoring (BLOOD PRESSURE KIT) KIT; Use daily to check blood pressure at home.  [place]                 No follow-ups on file.    Jose Raul Trejo  [facility]    Lupe Estrada is a 61 year old presenting for the following health issues:  Allergic Reaction    He was seen again in the emergency department yesterday for another episode of angioedema.  He was seen earlier in October where he was treated with TXA and had his lisinopril switched to irbesartan.  He then had similar symptoms yesterday, went to the emergency department once again required TXA as Solu-Medrol and Benadryl were not immediately effective.  He is now feeling back to normal.    In regards to his hypertension he was switched to hydrochlorothiazide yesterday.  He has taken 1 dose of this and has tolerated this well.    HPI           Review of Systems         Objective    BP (!) 134/94 (BP Location: Right arm, Patient Position: Sitting, Cuff Size: Adult Regular)   Pulse 64   Temp 97.1  F (36.2  C) (Tympanic)   Resp 20   Ht 1.676 m (5' 6\")   Wt 110.2 kg (243 lb)   SpO2 98%   BMI 39.22 kg/m    Body mass index is 39.22 kg/m .  Physical Exam   GENERAL: healthy, alert and no distress  EYES: Eyes grossly normal to inspection, PERRL and conjunctivae and sclerae normal  HENT: ear canals and TM's normal, nose and mouth without ulcers or lesions  RESP: lungs clear to auscultation - no rales, rhonchi or wheezes  CV: regular rate and " rhythm, normal S1 S2, no S3 or S4, no murmur, click or rub, no peripheral edema and peripheral pulses strong

## 2022-11-01 NOTE — NURSING NOTE
"Chief Complaint   Patient presents with     Allergic Reaction   Patient presents to the clinic for allergic reaction to medication. He was prescribed irbesartan/hctz 150-12.5mg and he had angioedema from it.     FOOD SECURITY SCREENING QUESTIONS:    The next two questions are to help us understand your food security.  If you are feeling you need any assistance in this area, we have resources available to support you today.    Hunger Vital Signs:  Within the past 12 months we worried whether our food would run out before we got money to buy more. Never  Within the past 12 months the food we bought just didn't last and we didn't have money to get more. Never    Advance Care Directive on file? No  Advance Care Directive provided to patient? Declined        Initial BP (!) 134/94 (BP Location: Right arm, Patient Position: Sitting, Cuff Size: Adult Regular)   Pulse 64   Temp 97.1  F (36.2  C) (Tympanic)   Resp 20   Ht 1.676 m (5' 6\")   Wt 110.2 kg (243 lb)   SpO2 98%   BMI 39.22 kg/m   Estimated body mass index is 39.22 kg/m  as calculated from the following:    Height as of this encounter: 1.676 m (5' 6\").    Weight as of this encounter: 110.2 kg (243 lb).  Medication Reconciliation: complete        Celine Fabian  "

## 2022-11-07 ENCOUNTER — OFFICE VISIT (OUTPATIENT)
Dept: SURGERY | Facility: OTHER | Age: 62
End: 2022-11-07
Attending: FAMILY MEDICINE
Payer: COMMERCIAL

## 2022-11-07 VITALS
SYSTOLIC BLOOD PRESSURE: 138 MMHG | HEART RATE: 66 BPM | OXYGEN SATURATION: 97 % | WEIGHT: 241.6 LBS | TEMPERATURE: 97.8 F | BODY MASS INDEX: 39 KG/M2 | DIASTOLIC BLOOD PRESSURE: 88 MMHG | RESPIRATION RATE: 20 BRPM

## 2022-11-07 DIAGNOSIS — K13.0 LIP LESION: Primary | ICD-10-CM

## 2022-11-07 PROCEDURE — 88305 TISSUE EXAM BY PATHOLOGIST: CPT

## 2022-11-07 PROCEDURE — G0463 HOSPITAL OUTPT CLINIC VISIT: HCPCS

## 2022-11-07 PROCEDURE — 88312 SPECIAL STAINS GROUP 1: CPT

## 2022-11-07 PROCEDURE — 11104 PUNCH BX SKIN SINGLE LESION: CPT | Performed by: SURGERY

## 2022-11-07 ASSESSMENT — PAIN SCALES - GENERAL: PAINLEVEL: NO PAIN (0)

## 2022-11-07 NOTE — PROGRESS NOTES
Procedure Note     Pre/Post Operative Diagnosis:  Lip lesion     Procedure:    Punch biopsy of lip lesion     Surgeon: RUCHI Herr MD     Local Anesthesia: 1% lidocaine    Indication for the procedure:    This is a 61 year old male patient with Lip lesion.  Patient states he first noticed this a few months ago [place]  He notes he got very dry chapped lips and developed a blister.  This wound has never quite healed and he presents for biopsy of nonhealing lip lesion.  Clinically, on the lower lip there is white macular type lesion with no crusting or ulceration.  No surrounding erythema.  We will plan for biopsy.  After explaining the risks to include bleeding, infection, recurrence or need for re-excision, and scarring the patient wished to proceed.    Procedure:   The area was prepped and draped in usual sterile fashion with Betadine. After adequate local anesthesia, a 3 mm punch biopsy was taken from the center of the lesion.  The resultant defect was closed with figure-of-eight 5-0 plain gut suture.  Patient tolerated the procedure well.    Plan:  The patient will be called with pathology results.  Patient will followup if there any problems with the wound including redness or drainage.      RUCHI Herr MD

## 2022-11-07 NOTE — NURSING NOTE
"Chief Complaint   Patient presents with     Derm Problem     Lesion on bottom lip that has been there for around 5-6 months.       Initial /88 (BP Location: Left arm, Patient Position: Sitting, Cuff Size: Adult Large)   Pulse 66   Temp 97.8  F (36.6  C) (Temporal)   Resp 20   Wt 109.6 kg (241 lb 9.6 oz)   SpO2 97%   BMI 39.00 kg/m   Estimated body mass index is 39 kg/m  as calculated from the following:    Height as of 11/1/22: 1.676 m (5' 6\").    Weight as of this encounter: 109.6 kg (241 lb 9.6 oz).  Medication Reconciliation: complete    Em Dallas LPN  "

## 2022-11-10 LAB
PATH REPORT.COMMENTS IMP SPEC: NORMAL
PATH REPORT.FINAL DX SPEC: NORMAL
PHOTO IMAGE: NORMAL

## 2022-11-14 ENCOUNTER — TELEPHONE (OUTPATIENT)
Dept: SURGERY | Facility: OTHER | Age: 62
End: 2022-11-14

## 2022-11-15 NOTE — TELEPHONE ENCOUNTER
Patient was notified of results by Em WASHINGTON LPN at 2:42pm Shruthi Delvalle RN  ....................  11/15/2022   2:55 PM

## 2022-11-29 ENCOUNTER — OFFICE VISIT (OUTPATIENT)
Dept: FAMILY MEDICINE | Facility: OTHER | Age: 62
End: 2022-11-29
Attending: FAMILY MEDICINE
Payer: COMMERCIAL

## 2022-11-29 VITALS
BODY MASS INDEX: 39.22 KG/M2 | TEMPERATURE: 97.1 F | DIASTOLIC BLOOD PRESSURE: 98 MMHG | HEART RATE: 60 BPM | SYSTOLIC BLOOD PRESSURE: 152 MMHG | WEIGHT: 243 LBS

## 2022-11-29 DIAGNOSIS — I10 HYPERTENSION GOAL BP (BLOOD PRESSURE) < 140/90: Primary | ICD-10-CM

## 2022-11-29 PROCEDURE — G0463 HOSPITAL OUTPT CLINIC VISIT: HCPCS

## 2022-11-29 PROCEDURE — 99213 OFFICE O/P EST LOW 20 MIN: CPT | Performed by: FAMILY MEDICINE

## 2022-11-29 RX ORDER — AMLODIPINE BESYLATE 5 MG/1
5 TABLET ORAL DAILY
Qty: 30 TABLET | Refills: 3 | Status: SHIPPED | OUTPATIENT
Start: 2022-11-29 | End: 2022-12-20

## 2022-11-29 RX ORDER — HYDROCHLOROTHIAZIDE 25 MG/1
25 TABLET ORAL DAILY
Qty: 90 TABLET | Refills: 3 | Status: SHIPPED | OUTPATIENT
Start: 2022-11-29 | End: 2023-11-27

## 2022-11-29 NOTE — NURSING NOTE
Pt presents to clinic today for medication refills.      Medication Reconciliation: complete  Shelbie Montilla LPN

## 2022-11-29 NOTE — PROGRESS NOTES
"  Assessment & Plan     Hypertension goal BP (blood pressure) < 140/90  We will add amlodipine 5 mg daily as he is likely not going to change his diet.  He will continue checking his blood pressure at home and follow-up sometime in January for reassessment.  He will contact me sooner if he has any side effects.  - hydrochlorothiazide (HYDRODIURIL) 25 MG tablet; Take 1 tablet (25 mg) by mouth daily  - amLODIPine (NORVASC) 5 MG tablet; Take 1 tablet (5 mg) by mouth daily             BMI:   Estimated body mass index is 39.22 kg/m  as calculated from the following:    Height as of 11/1/22: 1.676 m (5' 6\").    Weight as of this encounter: 110.2 kg (243 lb).           No follow-ups on file.    Jose Raul Trejo MD  [facility] [place] [facility]    Subjective   Sean is a 61 year old, presenting for the following health issues:  Recheck Medication    He has a history of hypertension.  He has had some medication adjustment recently due to angioedema.  At his last visit hydrochlorothiazide was continued and has been tolerating this well.  He has been checking his blood pressure at home and initially his blood pressure was well controlled however over the last week his blood pressure is increased generally in the 140s over 90s.  He admits that he is likely having increased salt in his diet recently.  He is otherwise tolerating this medication well.    HPI     Hypertension Follow-up      Do you check your blood pressure regularly outside of the clinic? Yes     Are you following a low salt diet? No    Are your blood pressures ever more than 140 on the top number (systolic) OR more   than 90 on the bottom number (diastolic), for example 140/90? Yes      How many servings of fruits and vegetables do you eat daily?  0-1    On average, how many sweetened beverages do you drink each day (Examples: soda, juice, sweet tea, etc.  Do NOT count diet or artificially sweetened beverages)?   1    How many days per week do you exercise enough " to make your heart beat faster? 3 or less    How many minutes a day do you exercise enough to make your heart beat faster? 9 or less    How many days per week do you miss taking your medication? 0        Review of Systems         Objective    BP (!) 152/98   Pulse 60   Temp 97.1  F (36.2  C) (Tympanic)   Wt 110.2 kg (243 lb)   BMI 39.22 kg/m    Body mass index is 39.22 kg/m .  Physical Exam   GENERAL: healthy, alert and no distress

## 2022-12-17 ENCOUNTER — HOSPITAL ENCOUNTER (EMERGENCY)
Facility: OTHER | Age: 62
Discharge: HOME | End: 2022-12-18
Attending: STUDENT IN AN ORGANIZED HEALTH CARE EDUCATION/TRAINING PROGRAM | Admitting: STUDENT IN AN ORGANIZED HEALTH CARE EDUCATION/TRAINING PROGRAM
Payer: COMMERCIAL

## 2022-12-17 DIAGNOSIS — T78.3XXA ANGIOEDEMA, INITIAL ENCOUNTER: ICD-10-CM

## 2022-12-17 LAB
HOLD SPECIMEN: NORMAL

## 2022-12-17 PROCEDURE — 96375 TX/PRO/DX INJ NEW DRUG ADDON: CPT | Mod: XU | Performed by: STUDENT IN AN ORGANIZED HEALTH CARE EDUCATION/TRAINING PROGRAM

## 2022-12-17 PROCEDURE — 99285 EMERGENCY DEPT VISIT HI MDM: CPT | Mod: 25 | Performed by: STUDENT IN AN ORGANIZED HEALTH CARE EDUCATION/TRAINING PROGRAM

## 2022-12-17 PROCEDURE — 96374 THER/PROPH/DIAG INJ IV PUSH: CPT | Mod: XU | Performed by: STUDENT IN AN ORGANIZED HEALTH CARE EDUCATION/TRAINING PROGRAM

## 2022-12-17 PROCEDURE — 250N000009 HC RX 250: Performed by: STUDENT IN AN ORGANIZED HEALTH CARE EDUCATION/TRAINING PROGRAM

## 2022-12-17 PROCEDURE — 99283 EMERGENCY DEPT VISIT LOW MDM: CPT | Mod: 25 | Performed by: STUDENT IN AN ORGANIZED HEALTH CARE EDUCATION/TRAINING PROGRAM

## 2022-12-17 PROCEDURE — 250N000011 HC RX IP 250 OP 636: Performed by: STUDENT IN AN ORGANIZED HEALTH CARE EDUCATION/TRAINING PROGRAM

## 2022-12-17 PROCEDURE — 92511 NASOPHARYNGOSCOPY: CPT | Performed by: STUDENT IN AN ORGANIZED HEALTH CARE EDUCATION/TRAINING PROGRAM

## 2022-12-17 RX ORDER — METHYLPREDNISOLONE SODIUM SUCCINATE 125 MG/2ML
125 INJECTION, POWDER, LYOPHILIZED, FOR SOLUTION INTRAMUSCULAR; INTRAVENOUS ONCE
Status: COMPLETED | OUTPATIENT
Start: 2022-12-17 | End: 2022-12-17

## 2022-12-17 RX ORDER — DIPHENHYDRAMINE HYDROCHLORIDE 50 MG/ML
50 INJECTION INTRAMUSCULAR; INTRAVENOUS ONCE
Status: COMPLETED | OUTPATIENT
Start: 2022-12-17 | End: 2022-12-17

## 2022-12-17 RX ORDER — LIDOCAINE 40 MG/G
CREAM TOPICAL
Status: COMPLETED | OUTPATIENT
Start: 2022-12-17 | End: 2022-12-17

## 2022-12-17 RX ORDER — TRANEXAMIC ACID 10 MG/ML
1 INJECTION, SOLUTION INTRAVENOUS ONCE
Status: COMPLETED | OUTPATIENT
Start: 2022-12-17 | End: 2022-12-17

## 2022-12-17 RX ADMIN — EPINEPHRINE 0.5 MG: 1 INJECTION INTRAMUSCULAR; INTRAVENOUS; SUBCUTANEOUS at 20:20

## 2022-12-17 RX ADMIN — LIDOCAINE: 40 CREAM TOPICAL at 20:42

## 2022-12-17 RX ADMIN — TRANEXAMIC ACID 1 G: 10 INJECTION, SOLUTION INTRAVENOUS at 21:27

## 2022-12-17 RX ADMIN — DIPHENHYDRAMINE HYDROCHLORIDE 50 MG: 50 INJECTION INTRAMUSCULAR; INTRAVENOUS at 20:36

## 2022-12-17 RX ADMIN — METHYLPREDNISOLONE SODIUM SUCCINATE 125 MG: 125 INJECTION, POWDER, FOR SOLUTION INTRAMUSCULAR; INTRAVENOUS at 20:35

## 2022-12-17 RX ADMIN — FAMOTIDINE 20 MG: 10 INJECTION, SOLUTION INTRAVENOUS at 20:36

## 2022-12-17 ASSESSMENT — ACTIVITIES OF DAILY LIVING (ADL)
ADLS_ACUITY_SCORE: 37
ADLS_ACUITY_SCORE: 37

## 2022-12-18 VITALS
HEART RATE: 74 BPM | DIASTOLIC BLOOD PRESSURE: 92 MMHG | RESPIRATION RATE: 19 BRPM | OXYGEN SATURATION: 94 % | SYSTOLIC BLOOD PRESSURE: 151 MMHG

## 2022-12-18 ASSESSMENT — ACTIVITIES OF DAILY LIVING (ADL): ADLS_ACUITY_SCORE: 37

## 2022-12-18 NOTE — DISCHARGE INSTRUCTIONS
Pt has black/green stools for 3 weeks and also  needs refill    You were seen in the emergency department for tongue swelling.  Follow-up with your allergy appointment in January.  Please return to the emergency department if you experience any swelling, difficulty breathing or changes in voice.

## 2022-12-18 NOTE — ED TRIAGE NOTES
Pt arrives via private vehicle with c/o tongue swelling that started at 1900, does not know what he is allergic to. Was taken off of his lisinopril.      Triage Assessment     Row Name 12/17/22 2015       Triage Assessment (Adult)    Airway WDL X  swollen tongue       Respiratory WDL    Respiratory WDL WDL       Skin Circulation/Temperature WDL    Skin Circulation/Temperature WDL WDL       Cardiac WDL    Cardiac WDL WDL       Peripheral/Neurovascular WDL    Peripheral Neurovascular WDL WDL       Cognitive/Neuro/Behavioral WDL    Cognitive/Neuro/Behavioral WDL WDL

## 2022-12-18 NOTE — ED PROVIDER NOTES
Emergency Department Provider Note  : 1960 Age: 61 year old Sex: male MRN: [id number]    Chief Complaint   Patient presents with     Oral Swelling       Medical Decision Making / Assessment / Plan   61 year old male presenting with right-sided tongue swelling.  On presentation, patient is mildly hypertensive, but otherwise hemodynamically stable and in no acute distress.  Physical exam notable for unilateral right-sided tongue swelling.  No wheezes appreciated and no urticaria noted.  Patient was given epinephrine 0.5 mg IM, Solu-Medrol, and pepcid IV.     ED Course as of 22 0604   Sat Dec 17, 2022   2035 Nasopharyngoscopy performed and with possible mild epiglottis swelling. No vocal cord or additional airway swelling noted. Adequate vocal cord movement.     Given his positive response to TXA in the past, TXA 1 g IV administered.     Will plan for observation overnight in the emergency department.      Patient was observed in the emergency department until approximately 0200 with improvement in his symptoms.  He was given strict return precautions should his symptoms worsen or should he have any further concerns.  He was advised to follow-up with his allergist in January.    The patient was informed of the plan and verbalized understanding and agreed with the plan. The patient was given strict return to Emergency Department precautions as well as appropriate follow up instructions. The patient was discharged in stable condition.    Discharge Medication List as of 2022  2:35 AM          Final diagnoses:   Angioedema, initial encounter       Juan Clay,   2022   Emergency Department    Lupe Watson is a 61 year old male who presents at  8:11 PM with tongue swelling.  Patient states around 1900 this evening he was watching television when he felt a tingling sensation on the right side of his tongue.  He looked in the mirror and noticed that his tongue was swelling,  prompting him to come to the emergency department.  He has a past medical history of angioedema, thought to be related to his ACE inhibitor.  He was previously stopped on his ACE inhibitor and placed on an ARB, but had recurrence and was subsequently stopped from his ARB.  He is now on amlodipine and hydrochlorothiazide for blood pressure control.  He states that his voice feels a little bit different but he is not having any difficulty swallowing or difficulty breathing at this time.  He denies any systemic rashes, nausea, vomiting or diarrhea.    I have reviewed the Medications, Allergies, Past Medical and Surgical History, and Social History in the [facility] and with family.    Review of Systems:  Please see HPI for pertinent positives and negatives. All other systems reviewed and found to be negative.      Objective   Pulse: 85  Resp: 18  SpO2: 97 %    Physical Exam:   General: Awake, alert, in no acute distress.  Head: Normocephalic, atraumatic.  Eyes: Conjugate gaze.  ENT: Moist membranes, external ear appears normal. Right unilateral tongue swelling.  Chest/Respiratory: Equal chest rise.  Cardiovascular: Peripheral pulses present.  Abdominal: Soft, non-distended, non-tender.  Extremities: No obvious deformity.  Neurological: GCS 15, moving all extremities without gross deficit.  Skin: Warm, no rashes, lesions, or bruising.   Psychiatric: Appropriate affect.     Procedures / Critical Care   Procedures    Critical Care Time: None.          Medical/Surgical History:  Past Medical History:   Diagnosis Date     Anaphylactic shock     9/15/2016     Complex tear of medial meniscus of right knee as current injury     No Comments Provided     Other specified postprocedural states     12/7/2016     S/P total hip arthroplasty, right 3/10/2015     Past Surgical History:   Procedure Laterality Date     ABSTRACT HEAVEN-NO CHARGE  05/2019    normal     [facility] TOTAL HIP ARTHROPLASTY Bilateral 2015    [place]        Medications:  No current facility-administered medications for this encounter.     Current Outpatient Medications   Medication     amLODIPine (NORVASC) 5 MG tablet     aspirin (ASA) 325 MG tablet     Blood Pressure Monitoring (BLOOD PRESSURE KIT) KIT     colchicine (COLCYRS) 0.6 MG tablet     diphenhydrAMINE (BENADRYL) 25 MG capsule     EPINEPHrine (ANY BX GENERIC EQUIV) 0.3 MG/0.3ML injection 2-pack     hydrochlorothiazide (HYDRODIURIL) 25 MG tablet       Allergies:  Irbesartan, Lisinopril, Wasp venom protein, and No known drug allergies    Relevant labs, images, EKGs, Epic and outside hospital (if applicable) charts were reviewed. The findings, diagnosis, plan, and need for follow up were discussed with the patient/family. Nursing notes were reviewed.      Juan Clay,   12/18/22 0605

## 2022-12-20 ENCOUNTER — OFFICE VISIT (OUTPATIENT)
Dept: FAMILY MEDICINE | Facility: OTHER | Age: 62
End: 2022-12-20
Attending: FAMILY MEDICINE
Payer: COMMERCIAL

## 2022-12-20 VITALS
SYSTOLIC BLOOD PRESSURE: 130 MMHG | HEART RATE: 59 BPM | WEIGHT: 242 LBS | DIASTOLIC BLOOD PRESSURE: 100 MMHG | BODY MASS INDEX: 39.06 KG/M2 | RESPIRATION RATE: 20 BRPM | TEMPERATURE: 97 F | OXYGEN SATURATION: 98 %

## 2022-12-20 DIAGNOSIS — I10 HYPERTENSION GOAL BP (BLOOD PRESSURE) < 140/90: ICD-10-CM

## 2022-12-20 DIAGNOSIS — T78.3XXD ANGIOEDEMA, SUBSEQUENT ENCOUNTER: Primary | ICD-10-CM

## 2022-12-20 PROCEDURE — 99213 OFFICE O/P EST LOW 20 MIN: CPT | Performed by: FAMILY MEDICINE

## 2022-12-20 PROCEDURE — G0463 HOSPITAL OUTPT CLINIC VISIT: HCPCS

## 2022-12-20 ASSESSMENT — PAIN SCALES - GENERAL: PAINLEVEL: NO PAIN (0)

## 2022-12-20 ASSESSMENT — ENCOUNTER SYMPTOMS: ALLERGIC REACTION: 1

## 2022-12-20 NOTE — NURSING NOTE
Pt presents to clinic today for A medication follow up and possible allergic reaction to medication. states he stopped taking his blood pressure medication, his tongue swelled up this weekend.       FOOD SECURITY SCREENING QUESTIONS:    The next two questions are to help us understand your food security.  If you are feeling you need any assistance in this area, we have resources available to support you today.    Hunger Vital Signs:  Within the past 12 months we worried whether our food would run out before we got money to buy more. Never  Within the past 12 months the food we bought just didn't last and we didn't have money to get more. Never            Medication Reconciliation: complete  Luis Enrique Ramirez, JAMES,LPN on 12/20/2022 at 10:51 AM

## 2022-12-20 NOTE — PROGRESS NOTES
"  Assessment & Plan     Angioedema, subsequent encounter  This seems to have once again resolved.  He has an allergy follow-up on January 4 which he will keep.    Hypertension goal BP (blood pressure) < 140/90  Blood pressures at this time are adequate.  He will continue monitoring at home.  Would like to obviously hold off on adding any other antihypertensives as he has had these reactions fairly quickly after starting new blood pressure medications.  He has a follow-up scheduled for later in January.  He will continue checking his blood pressures at home and bring those in with him.               BMI:   Estimated body mass index is 39.06 kg/m  as calculated from the following:    Height as of 11/1/22: 1.676 m (5' 6\").    Weight as of this encounter: 109.8 kg (242 lb).           No follow-ups on file.    Jose Raul Trejo MD  [facility]    Subjective   Sean is a 61 year old, presenting for the following health issues:  Recheck Medication and Allergic Reaction    He has a history of hypertension.  He has had numerous anaphylactic reactions to the point where he has had to go to the emergency department 3 times since October.  He is time he has had to receive TXA.  This typically resolves his symptoms fairly rapidly.  The first visit was when he was on ACE inhibitor, this was switched to an ARB and most recently he was switched to a calcium channel blocker.  All of his reactions occurred several weeks after initiating these medications.  He is now using only hydrochlorothiazide for hypertension.  He stopped amlodipine 3 days ago.  His blood pressures at home have been in the 120s 130s systolically.    Allergic Reaction    History of Present Illness       Reason for visit:  FOLLOW UP MEDS/ALLERGIC REACTION    He eats 2-3 servings of fruits and vegetables daily.He consumes 0 sweetened beverage(s) daily.He exercises with enough effort to increase his heart rate 9 or less minutes per day.  He exercises " with enough effort to increase his heart rate 3 or less days per week.   He is taking medications regularly.       Follow up allergic reaction to medication ,sattes his tongue swelled up and was seen in the ED Saturday night.       Review of Systems         Objective    BP (!) 140/92 (BP Location: Right arm, Patient Position: Sitting, Cuff Size: Adult Large)   Pulse 59   Temp 97  F (36.1  C) (Tympanic)   Resp 20   Wt 109.8 kg (242 lb)   SpO2 98%   BMI 39.06 kg/m    Body mass index is 39.06 kg/m .  Physical Exam   GENERAL: healthy, alert and no distress  HENT: ear canals and TM's normal, nose and mouth without ulcers or lesions

## 2023-01-09 ENCOUNTER — OFFICE VISIT (OUTPATIENT)
Dept: FAMILY MEDICINE | Facility: OTHER | Age: 63
End: 2023-01-09
Attending: FAMILY MEDICINE
Payer: COMMERCIAL

## 2023-01-09 VITALS
OXYGEN SATURATION: 97 % | BODY MASS INDEX: 38.57 KG/M2 | DIASTOLIC BLOOD PRESSURE: 88 MMHG | RESPIRATION RATE: 20 BRPM | HEIGHT: 66 IN | WEIGHT: 240 LBS | SYSTOLIC BLOOD PRESSURE: 136 MMHG | HEART RATE: 57 BPM | TEMPERATURE: 97.9 F

## 2023-01-09 DIAGNOSIS — T78.3XXD ANGIOEDEMA, SUBSEQUENT ENCOUNTER: ICD-10-CM

## 2023-01-09 DIAGNOSIS — I10 HYPERTENSION GOAL BP (BLOOD PRESSURE) < 140/90: Primary | ICD-10-CM

## 2023-01-09 PROCEDURE — G0463 HOSPITAL OUTPT CLINIC VISIT: HCPCS

## 2023-01-09 PROCEDURE — 99213 OFFICE O/P EST LOW 20 MIN: CPT | Performed by: FAMILY MEDICINE

## 2023-01-09 ASSESSMENT — PAIN SCALES - GENERAL: PAINLEVEL: NO PAIN (0)

## 2023-01-09 NOTE — NURSING NOTE
"Chief Complaint   Patient presents with     Follow Up     After allergist appt.         Initial /88   Pulse 57   Temp 97.9  F (36.6  C) (Tympanic)   Resp 20   Ht 1.676 m (5' 6\")   Wt 108.9 kg (240 lb)   SpO2 97%   BMI 38.74 kg/m   Estimated body mass index is 38.74 kg/m  as calculated from the following:    Height as of this encounter: 1.676 m (5' 6\").    Weight as of this encounter: 108.9 kg (240 lb).         Norma J. Gosselin, LPN   "

## 2023-01-09 NOTE — PROGRESS NOTES
"SUBJECTIVE:  Walter Oleary is a 62 year old male here for follow-up.  He has a history of hypertension.  Over the past few months when he had new antihypertensives initiated he had several episodes of angioedema.  This required trip to the emergency department.  He was nonresponsive to epinephrine and required TXA therapy.  He was recently seen by an allergist and had some blood work performed.    In regards to his blood pressure he has been checking his at home on a daily basis and is generally in the 130s over 80s.    ROS:    As above otherwise ROS is unremarkable.    OBJECTIVE:  /88   Pulse 57   Temp 97.9  F (36.6  C) (Tympanic)   Resp 20   Ht 1.676 m (5' 6\")   Wt 108.9 kg (240 lb)   SpO2 97%   BMI 38.74 kg/m      EXAM:  General Appearance: Pleasant, alert, appropriate appearance for age. No acute distress    ASSESSEMENT AND PLAN:    1. Hypertension goal BP (blood pressure) < 140/90    2. Angioedema, subsequent encounter      Reviewed blood pressure readings and they are currently adequate especially in light of his significant reactions to other antihypertensives.  At this time we will continue with hydrochlorothiazide.  He will start working on lifestyle modification and attempt to lose weight.    He will follow-up with allergy to review recent labs and to discuss further recommendations.    Jann Trejo MD  Family Medicine  "

## 2023-08-21 ENCOUNTER — HOSPITAL ENCOUNTER (EMERGENCY)
Facility: OTHER | Age: 63
Discharge: HOME | End: 2023-08-21
Attending: FAMILY MEDICINE | Admitting: FAMILY MEDICINE
Payer: COMMERCIAL

## 2023-08-21 VITALS
DIASTOLIC BLOOD PRESSURE: 66 MMHG | HEIGHT: 66 IN | HEART RATE: 67 BPM | WEIGHT: 235 LBS | SYSTOLIC BLOOD PRESSURE: 121 MMHG | OXYGEN SATURATION: 96 % | TEMPERATURE: 97.4 F | BODY MASS INDEX: 37.77 KG/M2 | RESPIRATION RATE: 18 BRPM

## 2023-08-21 DIAGNOSIS — Z91.030 BEE STING ALLERGY: ICD-10-CM

## 2023-08-21 PROCEDURE — 96374 THER/PROPH/DIAG INJ IV PUSH: CPT

## 2023-08-21 PROCEDURE — 99291 CRITICAL CARE FIRST HOUR: CPT | Mod: 25

## 2023-08-21 PROCEDURE — 250N000011 HC RX IP 250 OP 636: Mod: JZ | Performed by: FAMILY MEDICINE

## 2023-08-21 PROCEDURE — 96361 HYDRATE IV INFUSION ADD-ON: CPT

## 2023-08-21 PROCEDURE — 258N000003 HC RX IP 258 OP 636: Performed by: FAMILY MEDICINE

## 2023-08-21 PROCEDURE — 96372 THER/PROPH/DIAG INJ SC/IM: CPT | Mod: XS

## 2023-08-21 PROCEDURE — 250N000013 HC RX MED GY IP 250 OP 250 PS 637: Performed by: FAMILY MEDICINE

## 2023-08-21 PROCEDURE — 99291 CRITICAL CARE FIRST HOUR: CPT | Performed by: FAMILY MEDICINE

## 2023-08-21 PROCEDURE — 96375 TX/PRO/DX INJ NEW DRUG ADDON: CPT

## 2023-08-21 PROCEDURE — 250N000009 HC RX 250: Performed by: FAMILY MEDICINE

## 2023-08-21 RX ORDER — DEXAMETHASONE SODIUM PHOSPHATE 10 MG/ML
10 INJECTION, SOLUTION INTRAMUSCULAR; INTRAVENOUS ONCE
Status: COMPLETED | OUTPATIENT
Start: 2023-08-21 | End: 2023-08-21

## 2023-08-21 RX ORDER — LORATADINE 10 MG/1
10 TABLET ORAL ONCE
Status: COMPLETED | OUTPATIENT
Start: 2023-08-21 | End: 2023-08-21

## 2023-08-21 RX ORDER — SODIUM CHLORIDE 9 MG/ML
INJECTION, SOLUTION INTRAVENOUS CONTINUOUS
Status: DISCONTINUED | OUTPATIENT
Start: 2023-08-21 | End: 2023-08-21 | Stop reason: HOSPADM

## 2023-08-21 RX ADMIN — EPINEPHRINE 0.3 MG: 1 INJECTION INTRAMUSCULAR; INTRAVENOUS; SUBCUTANEOUS at 15:25

## 2023-08-21 RX ADMIN — LORATADINE 10 MG: 10 TABLET ORAL at 15:12

## 2023-08-21 RX ADMIN — FAMOTIDINE 20 MG: 10 INJECTION, SOLUTION INTRAVENOUS at 15:01

## 2023-08-21 RX ADMIN — DEXAMETHASONE SODIUM PHOSPHATE 10 MG: 10 INJECTION, SOLUTION INTRAMUSCULAR; INTRAVENOUS at 15:02

## 2023-08-21 RX ADMIN — SODIUM CHLORIDE: 9 INJECTION, SOLUTION INTRAVENOUS at 15:03

## 2023-08-21 RX ADMIN — SODIUM CHLORIDE: 9 INJECTION, SOLUTION INTRAVENOUS at 15:34

## 2023-08-21 ASSESSMENT — ACTIVITIES OF DAILY LIVING (ADL)
ADLS_ACUITY_SCORE: 37
ADLS_ACUITY_SCORE: 37

## 2023-08-21 NOTE — ED PROVIDER NOTES
History     Chief Complaint   Patient presents with    Insect Bite     The history is provided by the patient.     Walter Oleary is a 62 year old male who was stung by a bee or wasp on the top of his head. This occurred just PTA. He has an allergy to stings so he gave an Epi Pen and took some Benadryl and came to the ED.  Here in the ED he has red rash on his chest and neck. He says that his tongue feels a little weird but nothing like the last time he was stung. He did use his Epi Pen last time but did not keep it in his leg long enough.     Allergies:  Allergies   Allergen Reactions    Irbesartan Angioedema     Irbesartan/hctz  150mg/12.5mg    Lisinopril      Oral swelling    Meat Extract Swelling     Red meat allergy    Wasp Venom Protein Hives     Other reaction(s): Angioedema    No Known Drug Allergy        Problem List:    Patient Active Problem List    Diagnosis Date Noted    Angioedema due to angiotensin converting enzyme inhibitor (ACE-I) 10/10/2022     Priority: Medium    Obesity (BMI 35.0-39.9) with comorbidity (H) 04/23/2019     Priority: Medium    Bee sting allergy 02/21/2017     Priority: Medium    Psoriasis 11/18/2016     Priority: Medium    Benign essential hypertension 09/15/2016     Priority: Medium    Hypertension goal BP (blood pressure) < 140/90 09/02/2011     Priority: Medium        Past Medical History:    Past Medical History:   Diagnosis Date    Anaphylactic shock     Complex tear of medial meniscus of right knee as current injury     Other specified postprocedural states     S/P total hip arthroplasty, right 3/10/2015       Past Surgical History:    Past Surgical History:   Procedure Laterality Date    ABSTRACT SIMRANUARD-NO CHARGE  05/2019    normal    [facility] TOTAL HIP ARTHROPLASTY Bilateral 2015    [place]       Family History:    No family history on file.    Social History:  Marital Status:   [2]  Social History     Tobacco Use    Smoking status: Never     Passive exposure: Yes  "   Smokeless tobacco: Never   Vaping Use    Vaping Use: Never used   Substance Use Topics    Alcohol use: Not Currently     Comment: Alcoholic Drinks/day: occasionally    Drug use: Never        Medications:    Blood Pressure Monitoring (BLOOD PRESSURE KIT) KIT  colchicine (COLCYRS) 0.6 MG tablet  diphenhydrAMINE (BENADRYL) 25 MG capsule  EPINEPHrine (ANY BX GENERIC EQUIV) 0.3 MG/0.3ML injection 2-pack  hydrochlorothiazide (HYDRODIURIL) 25 MG tablet      Review of Systems   Skin:  Positive for rash.   All other systems reviewed and are negative.      Physical Exam   BP: (!) 158/131  Pulse: 74  Temp: 97.4  F (36.3  C)  Resp: 20  Height: 167.6 cm (5' 6\")  Weight: 106.6 kg (235 lb)  SpO2: 96 %      Physical Exam  Vitals and nursing note reviewed.   Constitutional:       General: He is not in acute distress.     Appearance: Normal appearance. He is not ill-appearing, toxic-appearing or diaphoretic.   HENT:      Right Ear: External ear normal.      Left Ear: External ear normal.      Nose: Nose normal.      Mouth/Throat:      Mouth: Mucous membranes are moist.      Pharynx: Oropharynx is clear.      Comments: No swelling of tongue, soft palate, uvula or lips.  Eyes:      Comments: No swelling around his eyes   Cardiovascular:      Rate and Rhythm: Normal rate and regular rhythm.      Pulses: Normal pulses.      Heart sounds: Normal heart sounds.   Pulmonary:      Effort: Pulmonary effort is normal. No respiratory distress.      Breath sounds: Normal breath sounds. No stridor. No wheezing, rhonchi or rales.   Skin:     General: Skin is warm and dry.      Findings: Erythema present.      Comments: He has rash with vesicles under the breasts and axilla.   Neurological:      Mental Status: He is alert.         Medications   sodium chloride 0.9% infusion ( Intravenous $New Bag 8/21/23 9524)   famotidine (PEPCID) injection 20 mg (20 mg Intravenous $Given 8/21/23 1501)   EPINEPHrine (ADRENALINE) 5 mg in  mL infusion " "(PERIPHERAL) (has no administration in time range)   dexAMETHasone PF (DECADRON) injection 10 mg (10 mg Intravenous $Given 8/21/23 1502)   EPINEPHrine (ADRENALIN) kit 0.3 mg (0.3 mg Intramuscular $Given 8/21/23 1525)   loratadine (CLARITIN) tablet 10 mg (10 mg Oral $Given 8/21/23 1512)       Assessments & Plan (with Medical Decision Making)  Walter Oleary is a 62 year old male who was stung by a bee or wasp on the top of his head. This occurred just PTA. He has an allergy to stings so he gave an Epi Pen and took some Benadryl and came to the ED.  Here in the ED he has red rash on his chest and neck. He says that his tongue feels a little weird but nothing like the last time he was stung. He did use his Epi Pen last time but did not keep it in his leg long enough.  VS in the ED /66   Pulse 63   Temp 97.4  F (36.3  C) (Tympanic)   Resp 19   Ht 1.676 m (5' 6\")   Wt 106.6 kg (235 lb)   SpO2 97%   BMI 37.93 kg/m    Exam shows some skin redness, no oral swelling, as a result of bee sting (not really sure what stung him). He has had one dose of Epi Pen PTA. He was stung at about 1405 today. We gave IV fluids, decadron, Pepcid and oral Claritin.  He needs to be monitored for adverse effects of epinephrine as well as anaphylaxis from the sting.   3:34 PM  He had a sense that his tongue was swelling a bit more and we gave a second epinephrine dose and moved him to room 2.  His rash has improved now, with less redness and no vesicles. We will continue monitoring.   6:32 PM   He has been doing well here.  He has more Epi Pens at home and does not need a refill.  We will get him home.   Aggregate Critical Care Time is 30 minutes.  This was the time seeing the patient at the bedside while the patient was critical.  My time did not include any pertinent procedures or activities that did not contribute to the patient's care while the patient was critical.         I have reviewed the nursing notes.    I have reviewed " the findings, diagnosis, plan and need for follow up with the patient.  Medical Decision Making  The patient's presentation was of high complexity (an acute health issue posing potential threat to life or bodily function).    The patient's evaluation involved:  an assessment requiring an independent historian (see separate area of note for details)    The patient's management necessitated moderate risk (prescription drug management including medications given in the ED) and high risk (drug therapy requiring intensive monitoring (see separate area of note for details)).    Final diagnoses:   Bee sting allergy       8/21/2023   [facility] AND [facility], Mack Almodovar MD  08/21/23 1744

## 2023-08-21 NOTE — ED TRIAGE NOTES
Pt arrived to ER after being stung from a bee around 1410.  Pt has history of bee allergies.  Throat felt tight upon arrival.       Triage Assessment       Row Name 08/21/23 0521       Triage Assessment (Adult)    Airway WDL WDL       Respiratory WDL    Respiratory WDL WDL       Skin Circulation/Temperature WDL    Skin Circulation/Temperature WDL WDL       Cardiac WDL    Cardiac WDL WDL       Peripheral/Neurovascular WDL    Peripheral Neurovascular WDL WDL       Cognitive/Neuro/Behavioral WDL    Cognitive/Neuro/Behavioral WDL WDL

## 2023-08-21 NOTE — DISCHARGE INSTRUCTIONS
Thank you for choosing our Emergency Department for your care.     You may receive a phone call or letter for a survey about your care in our ED.  Please complete this as this is how we improve care for our patients.     If you have any questions after leaving the ED you can call or text me on my cell phone at [phone] and I will get back to you at some point. This does not mean that I am on call and if you are not doing well please return to the ED.     Sincerely,    Dr Orlando Colón M.D.

## 2023-08-21 NOTE — ED NOTES
Patient reports some slight improvement. Hives and redness appear unchanged. RN remains at bedside

## 2023-11-24 DIAGNOSIS — I10 HYPERTENSION GOAL BP (BLOOD PRESSURE) < 140/90: ICD-10-CM

## 2023-11-24 NOTE — TELEPHONE ENCOUNTER
Chari sent Rx request for the following:      Requested Prescriptions   Pending Prescriptions Disp Refills    hydrochlorothiazide (HYDRODIURIL) 25 MG tablet [Pharmacy Med Name: HYDROCHLOROTHIAZIDE 25MG TABLETS] 90 tablet 3     Sig: TAKE 1 TABLET(25 MG) BY MOUTH DAILY       Diuretics (Including Combos) Protocol Failed - 11/24/2023  3:58 AM        Failed - Normal serum creatinine on file in past 12 months     Recent Labs   Lab Test 10/31/22  0927   CR 1.32*              Failed - Normal serum potassium on file in past 12 months     Recent Labs   Lab Test 10/31/22  0927   POTASSIUM 4.1                    Failed - Normal serum sodium on file in past 12 months     Recent Labs   Lab Test 10/31/22  0927                   Last Prescription Date:   11/29/22  Last Fill Qty/Refills:         90, R-3    Last Office Visit:              1/9/23   Future Office visit:           11/27/23    Unable to complete prescription refill per RN Medication Refill Policy.   Routing to provider to review.  Holly Hernandez RN on 11/24/2023 at 4:54 PM

## 2023-11-27 ENCOUNTER — OFFICE VISIT (OUTPATIENT)
Dept: FAMILY MEDICINE | Facility: OTHER | Age: 63
End: 2023-11-27
Attending: FAMILY MEDICINE
Payer: COMMERCIAL

## 2023-11-27 VITALS
RESPIRATION RATE: 17 BRPM | OXYGEN SATURATION: 96 % | WEIGHT: 240 LBS | TEMPERATURE: 98 F | BODY MASS INDEX: 38.74 KG/M2 | SYSTOLIC BLOOD PRESSURE: 130 MMHG | HEART RATE: 66 BPM | DIASTOLIC BLOOD PRESSURE: 82 MMHG

## 2023-11-27 DIAGNOSIS — M1A.09X0 IDIOPATHIC CHRONIC GOUT OF MULTIPLE SITES WITHOUT TOPHUS: ICD-10-CM

## 2023-11-27 DIAGNOSIS — M79.674 GREAT TOE PAIN, RIGHT: ICD-10-CM

## 2023-11-27 DIAGNOSIS — Z12.5 SCREENING FOR PROSTATE CANCER: ICD-10-CM

## 2023-11-27 DIAGNOSIS — E66.01 MORBID OBESITY (H): Primary | ICD-10-CM

## 2023-11-27 DIAGNOSIS — Z91.030 BEE STING ALLERGY: ICD-10-CM

## 2023-11-27 DIAGNOSIS — I10 HYPERTENSION GOAL BP (BLOOD PRESSURE) < 140/90: ICD-10-CM

## 2023-11-27 PROBLEM — T78.3XXA ANGIOEDEMA DUE TO ANGIOTENSIN CONVERTING ENZYME INHIBITOR (ACE-I): Status: RESOLVED | Noted: 2022-10-10 | Resolved: 2023-11-27

## 2023-11-27 PROBLEM — T46.4X5A ANGIOEDEMA DUE TO ANGIOTENSIN CONVERTING ENZYME INHIBITOR (ACE-I): Status: RESOLVED | Noted: 2022-10-10 | Resolved: 2023-11-27

## 2023-11-27 LAB
ALBUMIN SERPL BCG-MCNC: 4.4 G/DL (ref 3.5–5.2)
ALP SERPL-CCNC: 82 U/L (ref 40–150)
ALT SERPL W P-5'-P-CCNC: 72 U/L (ref 0–70)
ANION GAP SERPL CALCULATED.3IONS-SCNC: 13 MMOL/L (ref 7–15)
AST SERPL W P-5'-P-CCNC: 64 U/L (ref 0–45)
BILIRUB SERPL-MCNC: 0.7 MG/DL
BUN SERPL-MCNC: 14.7 MG/DL (ref 8–23)
CALCIUM SERPL-MCNC: 9.7 MG/DL (ref 8.8–10.2)
CHLORIDE SERPL-SCNC: 99 MMOL/L (ref 98–107)
CHOLEST SERPL-MCNC: 160 MG/DL
CREAT SERPL-MCNC: 1.11 MG/DL (ref 0.67–1.17)
DEPRECATED HCO3 PLAS-SCNC: 26 MMOL/L (ref 22–29)
EGFRCR SERPLBLD CKD-EPI 2021: 75 ML/MIN/1.73M2
GLUCOSE SERPL-MCNC: 113 MG/DL (ref 70–99)
HDLC SERPL-MCNC: 37 MG/DL
LDLC SERPL CALC-MCNC: 90 MG/DL
NONHDLC SERPL-MCNC: 123 MG/DL
POTASSIUM SERPL-SCNC: 3.4 MMOL/L (ref 3.4–5.3)
PROT SERPL-MCNC: 7.8 G/DL (ref 6.4–8.3)
PSA SERPL DL<=0.01 NG/ML-MCNC: 1.18 NG/ML (ref 0–4.5)
SODIUM SERPL-SCNC: 138 MMOL/L (ref 135–145)
TRIGL SERPL-MCNC: 165 MG/DL
URATE SERPL-MCNC: 9.4 MG/DL (ref 3.4–7)

## 2023-11-27 PROCEDURE — 36415 COLL VENOUS BLD VENIPUNCTURE: CPT | Mod: ZL | Performed by: FAMILY MEDICINE

## 2023-11-27 PROCEDURE — G0103 PSA SCREENING: HCPCS | Mod: ZL | Performed by: FAMILY MEDICINE

## 2023-11-27 PROCEDURE — 99214 OFFICE O/P EST MOD 30 MIN: CPT | Performed by: FAMILY MEDICINE

## 2023-11-27 PROCEDURE — 80053 COMPREHEN METABOLIC PANEL: CPT | Mod: ZL | Performed by: FAMILY MEDICINE

## 2023-11-27 PROCEDURE — G0463 HOSPITAL OUTPT CLINIC VISIT: HCPCS

## 2023-11-27 PROCEDURE — 80061 LIPID PANEL: CPT | Mod: ZL | Performed by: FAMILY MEDICINE

## 2023-11-27 PROCEDURE — 84550 ASSAY OF BLOOD/URIC ACID: CPT | Mod: ZL | Performed by: FAMILY MEDICINE

## 2023-11-27 RX ORDER — ALLOPURINOL 300 MG/1
300 TABLET ORAL DAILY
Qty: 90 TABLET | Refills: 3 | Status: SHIPPED | OUTPATIENT
Start: 2023-11-27

## 2023-11-27 RX ORDER — HYDROCHLOROTHIAZIDE 25 MG/1
25 TABLET ORAL DAILY
Qty: 90 TABLET | Refills: 3 | Status: SHIPPED | OUTPATIENT
Start: 2023-11-27 | End: 2023-11-27

## 2023-11-27 RX ORDER — EPINEPHRINE 0.3 MG/.3ML
0.3 INJECTION SUBCUTANEOUS PRN
Qty: 4 EACH | Refills: 1 | Status: SHIPPED | OUTPATIENT
Start: 2023-11-27 | End: 2023-11-27

## 2023-11-27 RX ORDER — EPINEPHRINE 0.3 MG/.3ML
0.3 INJECTION SUBCUTANEOUS PRN
Qty: 4 EACH | Refills: 11 | Status: SHIPPED | OUTPATIENT
Start: 2023-11-27

## 2023-11-27 RX ORDER — HYDROCHLOROTHIAZIDE 25 MG/1
25 TABLET ORAL DAILY
Qty: 90 TABLET | Refills: 3 | Status: SHIPPED | OUTPATIENT
Start: 2023-11-27

## 2023-11-27 RX ORDER — COLCHICINE 0.6 MG/1
0.6 TABLET ORAL DAILY
Qty: 30 TABLET | Refills: 1 | Status: SHIPPED | OUTPATIENT
Start: 2023-11-27

## 2023-11-27 ASSESSMENT — PAIN SCALES - GENERAL: PAINLEVEL: NO PAIN (0)

## 2023-11-27 NOTE — NURSING NOTE
"Chief Complaint   Patient presents with    Recheck Medication       Initial /82   Pulse 66   Temp 98  F (36.7  C)   Resp 17   Wt 108.9 kg (240 lb)   SpO2 96%   BMI 38.74 kg/m   Estimated body mass index is 38.74 kg/m  as calculated from the following:    Height as of 8/21/23: 1.676 m (5' 6\").    Weight as of this encounter: 108.9 kg (240 lb).  Medication Reconciliation: complete    FOOD SECURITY SCREENING QUESTIONS  Hunger Vital Signs:  Within the past 12 months we worried whether our food would run out before we got money to buy more. Never  Within the past 12 months the food we bought just didn't last and we didn't have money to get more. Never  Antonieta Francisco LPN 11/27/2023 9:38 AM         Antonieta Francisco LPN    "

## 2023-11-27 NOTE — COMMUNITY RESOURCES LIST (ENGLISH)
11/27/2023   [facility]  N/A  For questions about this resource list or additional care needs, please contact your primary care clinic or care manager.  Phone: [phone]   Email: N/A   Address: [address] [place] [id number]   Hours: N/A        Housing       Shelter for families  1  [facility] Distance: 16.43 miles      In-Person   [address] [address]  Language: English  Hours: Mon - Sun Open 24 Hours  Fees: Free   Phone: (822) 954-6962 Email: info@[employer].HTP Website: https://www.PinMyPet/     Shelter for individuals  2  [facility] Distance: 16.43 miles      In-Person   [address] [address]  Language: English  Hours: Mon - Sun Open 24 Hours  Fees: Free   Phone: (812) 716-4944 Email: info@[employer].HTP Website: https://www.[employer].org/          Important Numbers & Websites       Emergency Services   911  [facility]   311  Poison Control   (235) 594-4123  Suicide Prevention Lifeline   (789) 923-3263 (TALK)  Child Abuse Hotline   (149) 899-1146 (4-A-Child)  Sexual Assault Hotline   (151) 834-1910 (HOPE)  National Runaway Safeline   (777) 974-9055 (RUNAWAY)  All-Options Talkline   (513) 415-8995  Substance Abuse Referral   (603) 523-2527 (HELP)

## 2023-11-27 NOTE — PROGRESS NOTES
"  Assessment & Plan     Bee sting allergy  Refill epinephrine pen.  - EPINEPHrine (ANY BX GENERIC EQUIV) 0.3 MG/0.3ML injection 2-pack; Inject 0.3 mLs (0.3 mg) into the muscle as needed for anaphylaxis    Great toe pain, right  Has a history of gout.  Will check uric acid level and consider allopurinol if this is elevated.  Colchicine was refilled.  Discussed dietary recommendations.  - colchicine (COLCRYS) 0.6 MG tablet; Take 1 tablet (0.6 mg) by mouth daily Take 1.2 mg (2 tablets) by mouth at first sign of flare.  Then take 0.6 mg (1 tablet) by mouth one hour later.  Do not exceed 1.8 mg (3 tablets) in a one hour period.  - Uric acid; Future  - Uric acid    Hypertension goal BP (blood pressure) < 140/90  Blood pressure controlled, continue current regimen.  - hydrochlorothiazide (HYDRODIURIL) 25 MG tablet; Take 1 tablet (25 mg) by mouth daily  - Comprehensive Metabolic Panel; Future  - Lipid Panel; Future  - Comprehensive Metabolic Panel  - Lipid Panel    Obesity (BMI 35.0-39.9) with comorbidity (H)  Discussed importance of weight loss through diet and exercise    Screening for prostate cancer  Repeat PSA  - PSA Screen GH; Future  - PSA Screen GH    Idiopathic chronic gout of multiple sites without tophus  See above         BMI:   Estimated body mass index is 38.74 kg/m  as calculated from the following:    Height as of 8/21/23: 1.676 m (5' 6\").    Weight as of this encounter: 108.9 kg (240 lb).   Weight management plan: Discussed healthy diet and exercise guidelines      No follow-ups on file.    Jose Raul Trejo MD  [facility]    Subjective   Sean is a 62 year old, presenting for the following health issues:  Recheck Medication    Has a history of hypertension.  Is been checking blood pressure at home is generally in the 120s 130s systolically.  He is tolerating his medication well.    He has a history of anaphylaxis and allergies.  He got stung by a bee earlier this summer with a significant " response.  He also likely has tick related allergies to red meat as he has been unable to eat red meat for the last year.    History of Present Illness       Reason for visit:  Med refillHe consumes 0 sweetened beverage(s) daily.He exercises with enough effort to increase his heart rate 9 or less minutes per day.  He exercises with enough effort to increase his heart rate 3 or less days per week.   He is taking medications regularly.         Hypertension Follow-up    Do you check your blood pressure regularly outside of the clinic? Yes   Are you following a low salt diet? No  Are your blood pressures ever more than 140 on the top number (systolic) OR more   than 90 on the bottom number (diastolic), for example 140/90? No  How many servings of fruits and vegetables do you eat daily?  0-1  On average, how many sweetened beverages do you drink each day (Examples: soda, juice, sweet tea, etc.  Do NOT count diet or artificially sweetened beverages)?   0  How many days per week do you exercise enough to make your heart beat faster? 3 or less  How many minutes a day do you exercise enough to make your heart beat faster? 9 or less  How many days per week do you miss taking your medication? 0        Review of Systems         Objective    There were no vitals taken for this visit.  There is no height or weight on file to calculate BMI.  Physical Exam   GENERAL: healthy, alert and no distress  EYES: Eyes grossly normal to inspection, PERRL and conjunctivae and sclerae normal  HENT: ear canals and TM's normal, nose and mouth without ulcers or lesions  NECK: no adenopathy, no asymmetry, masses, or scars and thyroid normal to palpation  RESP: lungs clear to auscultation - no rales, rhonchi or wheezes  CV: regular rate and rhythm, normal S1 S2, no S3 or S4, no murmur, click or rub, no peripheral edema and peripheral pulses strong  MS: no gross musculoskeletal defects noted, no edema  SKIN: no suspicious lesions or rashes  NEURO:  Normal strength and tone, mentation intact and speech normal  PSYCH: mentation appears normal, affect normal/bright

## 2024-09-02 ENCOUNTER — HOSPITAL ENCOUNTER (EMERGENCY)
Facility: OTHER | Age: 64
Discharge: HOME | End: 2024-09-03
Payer: COMMERCIAL

## 2024-09-02 VITALS
RESPIRATION RATE: 20 BRPM | DIASTOLIC BLOOD PRESSURE: 94 MMHG | OXYGEN SATURATION: 95 % | TEMPERATURE: 98.1 F | WEIGHT: 240 LBS | BODY MASS INDEX: 38.57 KG/M2 | HEART RATE: 66 BPM | SYSTOLIC BLOOD PRESSURE: 154 MMHG | HEIGHT: 66 IN

## 2024-09-02 DIAGNOSIS — S65.312A LACERATION OF DEEP PALMAR ARCH OF LEFT HAND, INITIAL ENCOUNTER: ICD-10-CM

## 2024-09-02 PROCEDURE — 90715 TDAP VACCINE 7 YRS/> IM: CPT

## 2024-09-02 PROCEDURE — 90471 IMMUNIZATION ADMIN: CPT

## 2024-09-02 PROCEDURE — 99283 EMERGENCY DEPT VISIT LOW MDM: CPT | Mod: 25

## 2024-09-02 PROCEDURE — 12001 RPR S/N/AX/GEN/TRNK 2.5CM/<: CPT

## 2024-09-02 PROCEDURE — 99207 PR NO CHARGE LOS: CPT

## 2024-09-02 PROCEDURE — 250N000009 HC RX 250

## 2024-09-02 PROCEDURE — 250N000013 HC RX MED GY IP 250 OP 250 PS 637

## 2024-09-02 PROCEDURE — 250N000011 HC RX IP 250 OP 636

## 2024-09-02 RX ADMIN — CLOSTRIDIUM TETANI TOXOID ANTIGEN (FORMALDEHYDE INACTIVATED), CORYNEBACTERIUM DIPHTHERIAE TOXOID ANTIGEN (FORMALDEHYDE INACTIVATED), BORDETELLA PERTUSSIS TOXOID ANTIGEN (GLUTARALDEHYDE INACTIVATED), BORDETELLA PERTUSSIS FILAMENTOUS HEMAGGLUTININ ANTIGEN (FORMALDEHYDE INACTIVATED), BORDETELLA PERTUSSIS PERTACTIN ANTIGEN, AND BORDETELLA PERTUSSIS FIMBRIAE 2/3 ANTIGEN 0.5 ML: 5; 2; 2.5; 5; 3; 5 INJECTION, SUSPENSION INTRAMUSCULAR at 22:47

## 2024-09-02 RX ADMIN — LIDOCAINE HYDROCHLORIDE 10 ML: 10 INJECTION, SOLUTION EPIDURAL; INFILTRATION; INTRACAUDAL; PERINEURAL at 22:47

## 2024-09-02 RX ADMIN — AMOXICILLIN AND CLAVULANATE POTASSIUM 1 TABLET: 875; 125 TABLET, FILM COATED ORAL at 23:47

## 2024-09-02 ASSESSMENT — COLUMBIA-SUICIDE SEVERITY RATING SCALE - C-SSRS
2. HAVE YOU ACTUALLY HAD ANY THOUGHTS OF KILLING YOURSELF IN THE PAST MONTH?: NO
1. IN THE PAST MONTH, HAVE YOU WISHED YOU WERE DEAD OR WISHED YOU COULD GO TO SLEEP AND NOT WAKE UP?: NO
6. HAVE YOU EVER DONE ANYTHING, STARTED TO DO ANYTHING, OR PREPARED TO DO ANYTHING TO END YOUR LIFE?: NO

## 2024-09-02 ASSESSMENT — ENCOUNTER SYMPTOMS: WOUND: 1

## 2024-09-02 ASSESSMENT — ACTIVITIES OF DAILY LIVING (ADL): ADLS_ACUITY_SCORE: 37

## 2024-09-03 NOTE — ED PROVIDER NOTES
History     Chief Complaint   Patient presents with    Laceration     HPI  Walter Oleary is a 63 year old male with a laceration to his left hand.  He was cutting a tooth out of a dead bear when the knife slipped and went into the juarez surface of his hand. The blade went through and through. Bleeding controlled. The knife was dirty with bear blood. Denies any numbness or tingling.     Allergies:  Allergies   Allergen Reactions    Irbesartan Angioedema     Irbesartan/hctz  150mg/12.5mg    Lisinopril      Oral swelling    Meat Extract Swelling     Red meat allergy    Wasp Venom Protein Hives     Other reaction(s): Angioedema    No Known Drug Allergy        Problem List:    Patient Active Problem List    Diagnosis Date Noted    Idiopathic chronic gout of multiple sites without tophus 11/27/2023     Priority: Medium    Obesity (BMI 35.0-39.9) with comorbidity (H) 04/23/2019     Priority: Medium    Bee sting allergy 02/21/2017     Priority: Medium    Psoriasis 11/18/2016     Priority: Medium    Benign essential hypertension 09/15/2016     Priority: Medium    Hypertension goal BP (blood pressure) < 140/90 09/02/2011     Priority: Medium        Past Medical History:    Past Medical History:   Diagnosis Date    Anaphylactic shock     Complex tear of medial meniscus of right knee as current injury     Other specified postprocedural states     S/P total hip arthroplasty, right 3/10/2015       Past Surgical History:    Past Surgical History:   Procedure Laterality Date    ABSTRACT COLOGUARD-NO CHARGE  05/2019    normal    [facility] TOTAL HIP ARTHROPLASTY Bilateral 2015    [place]       Family History:    No family history on file.    Social History:  Marital Status:   [2]  Social History     Tobacco Use    Smoking status: Never     Passive exposure: Yes    Smokeless tobacco: Never   Vaping Use    Vaping status: Never Used   Substance Use Topics    Alcohol use: Not Currently     Comment: Alcoholic Drinks/day:  "occasionally    Drug use: Never        Medications:    amoxicillin-clavulanate (AUGMENTIN) 875-125 MG tablet  allopurinol (ZYLOPRIM) 300 MG tablet  Blood Pressure Monitoring (BLOOD PRESSURE KIT) KIT  colchicine (COLCRYS) 0.6 MG tablet  diphenhydrAMINE (BENADRYL) 25 MG capsule  EPINEPHrine (ANY BX GENERIC EQUIV) 0.3 MG/0.3ML injection 2-pack  hydrochlorothiazide (HYDRODIURIL) 25 MG tablet          Review of Systems   Skin:  Positive for wound.   All other systems reviewed and are negative.      Physical Exam   BP: (!) 154/94  Pulse: 66  Temp: 98.1  F (36.7  C)  Resp: 20  Height: 167.6 cm (5' 6\")  Weight: 108.9 kg (240 lb)  SpO2: 95 %      Physical Exam  Vitals reviewed.   Constitutional:       General: He is not in acute distress.     Appearance: Normal appearance.   Skin:     General: Skin is warm and dry.      Findings: Laceration present.      Comments: 1 cm laceration to the thenar region/base of left thumb. Second laceration is approximately 0.5cm middle of palmar surface of left hand. Sensation intact. 5/5 strength and motor function.    Neurological:      Mental Status: He is alert.                No results found for this or any previous visit (from the past 24 hour(s)).    Medications   lidocaine 1 % 10 mL (10 mLs Intradermal $Given 9/2/24 2247)   Tdap (tetanus-diphtheria-acell pertussis) (ADACEL) injection 0.5 mL (0.5 mLs Intramuscular $Given 9/2/24 2247)   amoxicillin-clavulanate (AUGMENTIN) 875-125 MG per tablet 1 tablet (1 tablet Oral $Given 9/2/24 4497)       Assessments & Plan (with Medical Decision Making)  Walter Oleary is a 63 year old male with a laceration to his left hand.  He was cutting a tooth out of a dead bear when the knife slipped and went into the juarez surface of his hand. The blade went through and through. Bleeding controlled. The knife was dirty with bear blood. Denies any numbness or tingling.   VS in the ED. BP (!) 154/94   Pulse 66   Temp 98.1  F (36.7  C) (Tympanic)   Resp 20  " " Ht 1.676 m (5' 6\")   Wt 108.9 kg (240 lb)   SpO2 95%   BMI 38.74 kg/m  Awake, alert, and conversant in no apparent distress. 1 cm laceration to the thenar region/base of left thumb. Second laceration is approximately 0.5cm middle of palmar surface of left hand. Sensation intact. 5/5 strength and motor function. Gave Tetanus and Augmentin.   [facility]    -Laceration Repair    Date/Time: 9/3/2024 1:40 AM    Performed by: Mariela Hendricks APRN CNP  Authorized by: Mariela Hendricks APRN CNP    Risks, benefits and alternatives discussed.      ANESTHESIA (see MAR for exact dosages):     Anesthesia method:  Local infiltration    Local anesthetic:  Lidocaine 1% w/o epi  LACERATION DETAILS     Location:  Hand    Hand location:  L palm    Length (cm):  1    REPAIR TYPE:     Repair type:  Simple    EXPLORATION:     Contaminated: yes      TREATMENT:     Amount of cleaning:  Extensive    Irrigation solution:  Sterile water    Irrigation volume:  1000    Irrigation method:  Pressure wash    Visualized foreign bodies/material removed: no      SKIN REPAIR     Repair method:  Steri-Strips    Number of Steri-Strips:  2    APPROXIMATION     Approximation:  Close    POST-PROCEDURE DETAILS     Dressing:  Non-adherent dressing (Kerlix)      PROCEDURE    Patient Tolerance:  Patient tolerated the procedure well with no immediate complications    Sean is a 63-year-old male evaluated today for laceration to the palmar surface of his left hand.  He was trying to cut a tooth out of a bare's mouth when the knife slipped and went into the palmar arch of his left hand.  The knife went through and through on his hand.  Left hand sensation intact. 5/5 strength and motor function.   Lidocaine 1% without epi used to anesthetize the skin locally.  Patient tolerated well.  Extensive irrigation used with sterile water.  Given the wound is contaminated and the knife went through and through will use Steri-Strips to close the " wound so it continues to drain.  Nonadherent dressing placed and wrapped with Kerlix.  Hand placed in a thumb spica splint to decrease movement and allow for healing.  Will treat empirically with Augmentin given the wound is contaminated.  Augmentin prescription sent to [facility].  Tetanus updated.  Close follow-up in 3 to 4 days for wound check.  Discussed return to ED precautions. Verbalizes understanding of discharge plan.      I have reviewed the nursing notes.    I have reviewed the findings, diagnosis, plan and need for follow up with the patient.  Medical Decision Making  The patient's presentation was of low complexity (an acute and uncomplicated illness or injury).    The patient's evaluation involved:  an assessment requiring an independent historian (see separate area of note for details)    The patient's management necessitated moderate risk (a decision regarding minor procedure (laceration repair) with risk factors of none).    Discharge Medication List as of 9/2/2024 11:52 PM        START taking these medications    Details   amoxicillin-clavulanate (AUGMENTIN) 875-125 MG tablet Take 1 tablet by mouth 2 times daily for 5 days., Disp-10 tablet, R-0, E-Prescribe           Final diagnoses:   Laceration of deep palmar arch of left hand, initial encounter     9/2/2024   [facility]       Mariela Hendricks, PATRIA CNP  09/03/24 0151

## 2024-09-03 NOTE — PROGRESS NOTES
Wound rinsed and dressed with provider. Discharge instructions given to patient who has no further questions.   
no

## 2024-09-03 NOTE — DISCHARGE INSTRUCTIONS
Please follow up as scheduled in 3-4 days for a wound recheck.     Keep wound clean and dry. Change dressing twice a day. Wash with mild soapy water.     Augmentin (antibiotic) twice daily for 5 days. Prescription sent to [facility] pharmacy.     Apply bacitracin/neosporin to wound daily.     Avoid submerging in water, pools, or lakes until healed.     Tylenol or ibuprofen as needed for pain.     Return to the ED if you experience redness, warmth, tenderness, drainage (pus), fever, or any new or worsening concerns.

## 2024-09-03 NOTE — ED TRIAGE NOTES
"ED Nursing Triage Note (General)   ________________________________    Walter Oleary is a 63 year old Male that presents to triage via private vehicle with complaints of a laceration to the L) hand.  Patient states he was attempting to remove the eye teeth in a bear to turn in to the DNR and patient states the knife slipped and lacerated his thumb.  Full movement of extremity is preserved.  Bleeding is controlled on arrival. Patient states he soaked his hand in Epson salt prior to arrival.   Significant symptoms had onset 2 hour(s) ago.  Vital signs:  Temp: 98.1  F (36.7  C) Temp src: Tympanic BP: (!) 154/94 Pulse: 66   Resp: 20 SpO2: 95 %     Height: 167.6 cm (5' 6\") Weight: 108.9 kg (240 lb)  Estimated body mass index is 38.74 kg/m  as calculated from the following:    Height as of this encounter: 1.676 m (5' 6\").    Weight as of this encounter: 108.9 kg (240 lb).  PRE HOSPITAL PRIOR LIVING SITUATION Spouse and Children      Triage Assessment (Adult)       Row Name 09/02/24 8129          Triage Assessment    Airway WDL WDL        Respiratory WDL    Respiratory WDL WDL        Skin Circulation/Temperature WDL    Skin Circulation/Temperature WDL X        Cardiac WDL    Cardiac WDL WDL     Cardiac Rhythm NSR        Peripheral/Neurovascular WDL    Peripheral Neurovascular WDL WDL        Cognitive/Neuro/Behavioral WDL    Cognitive/Neuro/Behavioral WDL WDL                     "

## 2024-09-06 ENCOUNTER — OFFICE VISIT (OUTPATIENT)
Dept: INTERNAL MEDICINE | Facility: OTHER | Age: 64
End: 2024-09-06
Payer: COMMERCIAL

## 2024-09-06 VITALS
BODY MASS INDEX: 38.47 KG/M2 | OXYGEN SATURATION: 98 % | RESPIRATION RATE: 18 BRPM | SYSTOLIC BLOOD PRESSURE: 132 MMHG | HEART RATE: 76 BPM | WEIGHT: 239.4 LBS | HEIGHT: 66 IN | DIASTOLIC BLOOD PRESSURE: 78 MMHG

## 2024-09-06 DIAGNOSIS — S61.012D LACERATION OF LEFT THUMB WITHOUT DAMAGE TO NAIL, FOREIGN BODY PRESENCE UNSPECIFIED, SUBSEQUENT ENCOUNTER: Primary | ICD-10-CM

## 2024-09-06 PROCEDURE — 99213 OFFICE O/P EST LOW 20 MIN: CPT

## 2024-09-06 PROCEDURE — G2211 COMPLEX E/M VISIT ADD ON: HCPCS

## 2024-09-06 ASSESSMENT — ENCOUNTER SYMPTOMS
FEVER: 0
WOUND: 1
CHILLS: 0

## 2024-09-06 ASSESSMENT — PAIN SCALES - GENERAL: PAINLEVEL: NO PAIN (0)

## 2024-09-06 NOTE — NURSING NOTE
"Chief Complaint   Patient presents with    Emergency Room follow Up     Left Hand Injury       Initial /78   Pulse 76   Resp 18   Ht 1.676 m (5' 6\")   Wt 108.6 kg (239 lb 6.4 oz)   SpO2 98%   BMI 38.64 kg/m   Estimated body mass index is 38.64 kg/m  as calculated from the following:    Height as of this encounter: 1.676 m (5' 6\").    Weight as of this encounter: 108.6 kg (239 lb 6.4 oz).  Medication Review: complete    The next two questions are to help us understand your food security.  If you are feeling you need any assistance in this area, we have resources available to support you today.          11/27/2023   SDOH- Food Insecurity   Within the past 12 months, did you worry that your food would run out before you got money to buy more? N   Within the past 12 months, did the food you bought just not last and you didn t have money to get more? N            Health Care Directive:  Patient does not have a Health Care Directive or Living Will: Discussed advance care planning with patient; however, patient declined at this time.    Daniela Wolf LPN      "

## 2024-09-06 NOTE — PROGRESS NOTES
"  Assessment & Plan     ICD-10-CM    1. Laceration of left thumb without damage to nail, foreign body presence unspecified, subsequent encounter  S61.012D          Lacerations healing well, no s/s of infection- dermabond applied today along with steri strips. Patient will continue to watch for s/s of infection and return if area does not continue to heal. Wound care instructions were provided.          MED REC REQUIRED  Post Medication Reconciliation Status:     BMI  Estimated body mass index is 38.64 kg/m  as calculated from the following:    Height as of this encounter: 1.676 m (5' 6\").    Weight as of this encounter: 108.6 kg (239 lb 6.4 oz).           No follow-ups on file.      PATRIA Malloy [facility]    Review of Systems   Constitutional:  Negative for chills and fever.   Skin:  Positive for wound.   All other systems reviewed and are negative.        Subjective   Sean is a 63 year old, presenting for the following health issues:  Emergency Room follow Up (Left Hand Injury)    Patient presents to clinic for ER follow-up following a hand laceration on 9-2-24 he was cutting a tooth out of a dead bear when the knife slipped and went into the palmar surface of his left hand.  He was started on antibiotics, Tdap was updated it was Steri-Stripped together and he was provided with a thumb spica for stabilization.        HPI                   Objective    /78   Pulse 76   Resp 18   Ht 1.676 m (5' 6\")   Wt 108.6 kg (239 lb 6.4 oz)   SpO2 98%   BMI 38.64 kg/m    Body mass index is 38.64 kg/m .  Physical Exam  Vitals reviewed.   Constitutional:       General: He is not in acute distress.  Skin:     Comments: Two healing thumb lacerations   Neurological:      Mental Status: He is alert.                  The longitudinal plan of care for the diagnosis(es)/condition(s) as documented were addressed during this visit. Due to the added complexity in care, I will continue to " support Sean in the subsequent management and with ongoing continuity of care.   Signed Electronically by: PATRIA Malloy CNP

## 2024-10-29 ENCOUNTER — TELEPHONE (OUTPATIENT)
Dept: FAMILY MEDICINE | Facility: OTHER | Age: 64
End: 2024-10-29
Payer: COMMERCIAL

## 2024-10-29 NOTE — TELEPHONE ENCOUNTER
Patient wants to come in for a med follow up with Dr. Trejo for hydrochlorothiazide.  He would like to come in on 11/25, med are out on the 26th.  Can we put him in a provider spot?        Jyoti Valenzuela on 10/29/2024 at 8:49 AM

## 2024-10-31 NOTE — TELEPHONE ENCOUNTER
He was last seen on 11- by me so he would be due for an annual exam/physical.  I am fine with that but just needs to be scheduled correctly.

## 2024-11-23 DIAGNOSIS — I10 HYPERTENSION GOAL BP (BLOOD PRESSURE) < 140/90: ICD-10-CM

## 2024-11-25 ENCOUNTER — OFFICE VISIT (OUTPATIENT)
Dept: FAMILY MEDICINE | Facility: OTHER | Age: 64
End: 2024-11-25
Attending: FAMILY MEDICINE
Payer: COMMERCIAL

## 2024-11-25 VITALS
HEIGHT: 66 IN | DIASTOLIC BLOOD PRESSURE: 78 MMHG | RESPIRATION RATE: 18 BRPM | HEART RATE: 60 BPM | SYSTOLIC BLOOD PRESSURE: 124 MMHG | TEMPERATURE: 97 F | OXYGEN SATURATION: 96 % | BODY MASS INDEX: 39.18 KG/M2 | WEIGHT: 243.8 LBS

## 2024-11-25 DIAGNOSIS — Z12.5 SCREENING FOR PROSTATE CANCER: ICD-10-CM

## 2024-11-25 DIAGNOSIS — Z91.030 BEE STING ALLERGY: ICD-10-CM

## 2024-11-25 DIAGNOSIS — I10 BENIGN ESSENTIAL HYPERTENSION: ICD-10-CM

## 2024-11-25 DIAGNOSIS — E66.01 MORBID OBESITY (H): ICD-10-CM

## 2024-11-25 DIAGNOSIS — Z00.00 PHYSICAL EXAM: Primary | ICD-10-CM

## 2024-11-25 DIAGNOSIS — M1A.09X0 IDIOPATHIC CHRONIC GOUT OF MULTIPLE SITES WITHOUT TOPHUS: ICD-10-CM

## 2024-11-25 LAB
ALBUMIN SERPL BCG-MCNC: 4.4 G/DL (ref 3.5–5.2)
ALP SERPL-CCNC: 71 U/L (ref 40–150)
ALT SERPL W P-5'-P-CCNC: 38 U/L (ref 0–70)
ANION GAP SERPL CALCULATED.3IONS-SCNC: 10 MMOL/L (ref 7–15)
AST SERPL W P-5'-P-CCNC: 41 U/L (ref 0–45)
BILIRUB SERPL-MCNC: 0.7 MG/DL
BUN SERPL-MCNC: 13.8 MG/DL (ref 8–23)
CALCIUM SERPL-MCNC: 9.3 MG/DL (ref 8.8–10.4)
CHLORIDE SERPL-SCNC: 102 MMOL/L (ref 98–107)
CHOLEST SERPL-MCNC: 189 MG/DL
CREAT SERPL-MCNC: 1.18 MG/DL (ref 0.67–1.17)
EGFRCR SERPLBLD CKD-EPI 2021: 69 ML/MIN/1.73M2
FASTING STATUS PATIENT QL REPORTED: YES
FASTING STATUS PATIENT QL REPORTED: YES
GLUCOSE SERPL-MCNC: 104 MG/DL (ref 70–99)
HCO3 SERPL-SCNC: 28 MMOL/L (ref 22–29)
HDLC SERPL-MCNC: 46 MG/DL
LDLC SERPL CALC-MCNC: 110 MG/DL
NONHDLC SERPL-MCNC: 143 MG/DL
POTASSIUM SERPL-SCNC: 3.7 MMOL/L (ref 3.4–5.3)
PROT SERPL-MCNC: 7.2 G/DL (ref 6.4–8.3)
PSA SERPL DL<=0.01 NG/ML-MCNC: 1.24 NG/ML (ref 0–4.5)
SODIUM SERPL-SCNC: 140 MMOL/L (ref 135–145)
TRIGL SERPL-MCNC: 163 MG/DL
URATE SERPL-MCNC: 8.7 MG/DL (ref 3.4–7)

## 2024-11-25 PROCEDURE — G0103 PSA SCREENING: HCPCS | Mod: ZL | Performed by: FAMILY MEDICINE

## 2024-11-25 PROCEDURE — 82435 ASSAY OF BLOOD CHLORIDE: CPT | Mod: ZL | Performed by: FAMILY MEDICINE

## 2024-11-25 PROCEDURE — 82247 BILIRUBIN TOTAL: CPT | Mod: ZL | Performed by: FAMILY MEDICINE

## 2024-11-25 PROCEDURE — 80061 LIPID PANEL: CPT | Mod: ZL | Performed by: FAMILY MEDICINE

## 2024-11-25 PROCEDURE — 84550 ASSAY OF BLOOD/URIC ACID: CPT | Mod: ZL | Performed by: FAMILY MEDICINE

## 2024-11-25 PROCEDURE — 80048 BASIC METABOLIC PNL TOTAL CA: CPT | Mod: ZL | Performed by: FAMILY MEDICINE

## 2024-11-25 PROCEDURE — 36415 COLL VENOUS BLD VENIPUNCTURE: CPT | Mod: ZL | Performed by: FAMILY MEDICINE

## 2024-11-25 RX ORDER — HYDROCHLOROTHIAZIDE 25 MG/1
25 TABLET ORAL DAILY
Qty: 90 TABLET | Refills: 4 | Status: SHIPPED | OUTPATIENT
Start: 2024-11-25

## 2024-11-25 RX ORDER — EPINEPHRINE 0.3 MG/.3ML
0.3 INJECTION SUBCUTANEOUS PRN
Qty: 4 EACH | Refills: 11 | Status: SHIPPED | OUTPATIENT
Start: 2024-11-25

## 2024-11-25 SDOH — HEALTH STABILITY: PHYSICAL HEALTH: ON AVERAGE, HOW MANY MINUTES DO YOU ENGAGE IN EXERCISE AT THIS LEVEL?: 20 MIN

## 2024-11-25 SDOH — HEALTH STABILITY: PHYSICAL HEALTH: ON AVERAGE, HOW MANY DAYS PER WEEK DO YOU ENGAGE IN MODERATE TO STRENUOUS EXERCISE (LIKE A BRISK WALK)?: 1 DAY

## 2024-11-25 ASSESSMENT — SOCIAL DETERMINANTS OF HEALTH (SDOH): HOW OFTEN DO YOU GET TOGETHER WITH FRIENDS OR RELATIVES?: ONCE A WEEK

## 2024-11-25 ASSESSMENT — PAIN SCALES - GENERAL: PAINLEVEL_OUTOF10: NO PAIN (0)

## 2024-11-25 NOTE — TELEPHONE ENCOUNTER
[facility] sent Rx request for the following:      Requested Prescriptions   Pending Prescriptions Disp Refills    hydrochlorothiazide (HYDRODIURIL) 25 MG tablet [Pharmacy Med Name: HYDROCHLOROTHIAZIDE 25MG TABLETS] 90 tablet 3     Sig: TAKE 1 TABLET(25 MG) BY MOUTH DAILY   Last Prescription Date:   11/27/23  Last Fill Qty/Refills:         90, R-3    Last Office Visit:              11/27/23 (HTN discussed)   Future Office visit:             Future Appointments 11/25/2024 - 5/24/2025        Date Visit Type Length Department Provider     11/25/2024  1:40 PM PREVENTATIVE ADULT 40 min  FAMILY PRACTICE Jose Raul Trejo MD    Location Instructions:     [facility] is located [address]                   Appointment today.    Unable to complete prescription refill per RN Medication Refill Policy. Daya Portillo RN .............. 11/25/2024  9:59 AM

## 2024-11-25 NOTE — PROGRESS NOTES
"Preventive Care Visit  [facility]  Jose Raul Trejo MD, Family Medicine  Nov 25, 2024      SUBJECTIVE:   Sean is a 63 year old, presenting for the following:  Physical (Medication recheck HCTZ)        11/25/2024     1:28 PM   Additional Questions   Roomed by Sharee Hinojosa LPN     HPI    He has a history of hypertension which has been well-controlled with hydrochlorothiazide.    He is a history of gout but does not recall a flareup since at least June.  He has not been using allopurinol for the last year.    He has a history of tick related allergy to red meat.  He has not tried meat in a couple of years.    He is otherwise doing well and has no other concerns.      Social History     Tobacco Use    Smoking status: Never     Passive exposure: Yes    Smokeless tobacco: Never   Substance Use Topics    Alcohol use: Not Currently     Comment: Alcoholic Drinks/day: occasionally             11/25/2024     1:22 PM   Alcohol Use   Prescreen: >3 drinks/day or >7 drinks/week? No        Patient-reported          No data to display                Last PSA:   PSA   Date Value Ref Range Status   02/26/2014 0.40 0 - 4 ug/L Final     Prostate Specific Antigen Screen   Date Value Ref Range Status   11/27/2023 1.18 0.00 - 4.50 ng/mL Final   05/31/2022 0.76 0.00 - 4.00 ug/L Final       Reviewed orders with patient. Reviewed health maintenance and updated orders accordingly - Yes      Reviewed and updated as needed this visit by clinical staff   Tobacco  Allergies  Meds      Soc Hx        Reviewed and updated as needed this visit by Provider                    Review of Systems      OBJECTIVE:   /78 (BP Location: Right arm, Patient Position: Sitting, Cuff Size: Adult Regular)   Pulse 60   Temp 97  F (36.1  C)   Resp 18   Ht 1.676 m (5' 6\")   Wt 110.6 kg (243 lb 12.8 oz)   SpO2 96%   BMI 39.35 kg/m     Estimated body mass index is 39.35 kg/m  as calculated from the following:    Height as of this " "encounter: 1.676 m (5' 6\").    Weight as of this encounter: 110.6 kg (243 lb 12.8 oz).  Physical Exam  GENERAL: alert and no distress  EYES: Eyes grossly normal to inspection, PERRL and conjunctivae and sclerae normal  HENT: ear canals and TM's normal, nose and mouth without ulcers or lesions  NECK: no adenopathy, no asymmetry, masses, or scars  RESP: lungs clear to auscultation - no rales, rhonchi or wheezes  CV: regular rate and rhythm, normal S1 S2, no S3 or S4, no murmur, click or rub, no peripheral edema  ABDOMEN: soft, nontender, no hepatosplenomegaly, no masses and bowel sounds normal  MS: no gross musculoskeletal defects noted, no edema  SKIN: no suspicious lesions or rashes  NEURO: Normal strength and tone, mentation intact and speech normal  PSYCH: mentation appears normal, affect normal/bright    Diagnostic Test Results:  none     ASSESSMENT/PLAN:   Physical exam  Reviewed immunization recommendations, he declines flu and COVID vaccines.    Idiopathic chronic gout of multiple sites without tophus  Will check uric acid.  He has not had a flare for at least 6 months and is not interested in restarting allopurinol but if he has a flareup he may benefit from uric acid lowering treatment.  - Uric acid; Future    Benign essential hypertension  Controlled, continue current regimen.  Will get yearly fasting labs today.  - Lipid Panel; Future  - Comprehensive Metabolic Panel; Future    Morbid obesity (H)  Encourage weight loss through diet and exercise.    Screening for prostate cancer  Repeat PSA  - PSA Screen GH; Future    Bee sting allergy  Refill epinephrine  - EPINEPHrine (ANY BX GENERIC EQUIV) 0.3 MG/0.3ML injection 2-pack; Inject 0.3 mLs (0.3 mg) into the muscle as needed for anaphylaxis.    Patient has been advised of split billing requirements and indicates understanding: Yes      Counseling  Reviewed preventive health counseling, as reflected in patient instructions      BMI  Estimated body mass index is " "39.35 kg/m  as calculated from the following:    Height as of this encounter: 1.676 m (5' 6\").    Weight as of this encounter: 110.6 kg (243 lb 12.8 oz).   Weight management plan: Discussed healthy diet and exercise guidelines      He reports that he has never smoked. He has been exposed to tobacco smoke. He has never used smokeless tobacco.            Signed Electronically by: Jose Raul Trejo MD  "

## 2024-11-25 NOTE — NURSING NOTE
"Chief Complaint   Patient presents with    Physical     Medication recheck HCTZ       Medication reconciliation completed.    FOOD SECURITY SCREENING QUESTIONS:    The next two questions are to help us understand your food security.  If you are feeling you need any assistance in this area, we have resources available to support you today.    Hunger Vital Signs:  Within the past 12 months we worried whether our food would run out before we got money to buy more. Never  Within the past 12 months the food we bought just didn't last and we didn't have money to get more. Never    Initial /78 (BP Location: Right arm, Patient Position: Sitting, Cuff Size: Adult Regular)   Pulse 60   Temp 97  F (36.1  C)   Resp 18   Ht 1.676 m (5' 6\")   Wt 110.6 kg (243 lb 12.8 oz)   SpO2 96%   BMI 39.35 kg/m   Estimated body mass index is 39.35 kg/m  as calculated from the following:    Height as of this encounter: 1.676 m (5' 6\").    Weight as of this encounter: 110.6 kg (243 lb 12.8 oz).       Sharee Hinojosa LPN .......  11/25/2024  1:32 PM    "

## 2024-11-25 NOTE — LETTER
November 25, 2024      Sean RODRIGUEZ Mamta  [address]  [facility] [address]        Dear QamarMamta,    We are writing to inform you of your test results.    Your cholesterol panel and diabetes screening both came back elevated.  This can be improved with weight loss through diet and exercise.  You do not need medications at this time but if this continues to worsen we would need to consider that in the future.    Your kidney function is slightly elevated, please drink more water.    Your liver and prostate testing came back normal.    Finally, testing for gout with uric acid show that your uric acid level is elevated.  If you were to start having more bouts of gout in the future I would recommend restarting allopurinol.    We should repeat your labs again in 1 year.    Resulted Orders   Lipid Panel   Result Value Ref Range    Cholesterol 189 <200 mg/dL    Triglycerides 163 (H) <150 mg/dL    Direct Measure HDL 46 >=40 mg/dL    LDL Cholesterol Calculated 110 (H) <100 mg/dL    Non HDL Cholesterol 143 (H) <130 mg/dL    Patient Fasting > 8hrs? Yes     Narrative    Cholesterol  Desirable: < 200 mg/dL  Borderline High: 200 - 239 mg/dL  High: >= 240 mg/dL    Triglycerides  Normal: < 150 mg/dL  Borderline High: 150 - 199 mg/dL  High: 200-499 mg/dL  Very High: >= 500 mg/dL    Direct Measure HDL  Female: >= 50 mg/dL   Male: >= 40 mg/dL    LDL Cholesterol  Desirable: < 100 mg/dL  Above Desirable: 100 - 129 mg/dL   Borderline High: 130 - 159 mg/dL   High:  160 - 189 mg/dL   Very High: >= 190 mg/dL    Non HDL Cholesterol  Desirable: < 130 mg/dL  Above Desirable: 130 - 159 mg/dL  Borderline High: 160 - 189 mg/dL  High: 190 - 219 mg/dL  Very High: >= 220 mg/dL   Comprehensive Metabolic Panel   Result Value Ref Range    Sodium 140 135 - 145 mmol/L    Potassium 3.7 3.4 - 5.3 mmol/L    Carbon Dioxide (CO2) 28 22 - 29 mmol/L    Anion Gap 10 7 - 15 mmol/L    Urea Nitrogen 13.8 8.0 - 23.0 mg/dL    Creatinine 1.18 (H) 0.67 - 1.17 mg/dL    GFR Estimate  69 >60 mL/min/1.73m2      Comment:      eGFR calculated using 2021 CKD-EPI equation.    Calcium 9.3 8.8 - 10.4 mg/dL      Comment:      Reference intervals for this test were updated on 7/16/2024 to reflect our healthy population more accurately. There may be differences in the flagging of prior results with similar values performed with this method. Those prior results can be interpreted in the context of the updated reference intervals.    Chloride 102 98 - 107 mmol/L    Glucose 104 (H) 70 - 99 mg/dL    Alkaline Phosphatase 71 40 - 150 U/L    AST 41 0 - 45 U/L    ALT 38 0 - 70 U/L    Protein Total 7.2 6.4 - 8.3 g/dL    Albumin 4.4 3.5 - 5.2 g/dL    Bilirubin Total 0.7 <=1.2 mg/dL    Patient Fasting > 8hrs? Yes    PSA Screen GH   Result Value Ref Range    Prostate Specific Antigen Screen 1.24 0.00 - 4.50 ng/mL    Narrative    This result is obtained using the Roche Elecsys total PSA method on the sylwia e601 immunoassay analyzer, which is an ultrasensitive method. Results obtained with different assay methods or kits cannot be used interchangeably.  This test is intended for initial prostate cancer screening. PSA values exceeding the age-specific limits are suspicious for prostate disease, but additional testing, such as prostate biopsy, is needed to diagnose prostate pathology. The American Cancer Society recommends annual examination with digital rectal examination and serum PSA beginning at age 50 and for men with a life expectancy of at least 10 years after detection of prostate cancer. For men in high-risk groups, such as  Americans or men with a first-degree relative diagnosed at a younger age, testing should begin at a younger age. It is generally recommended that information be provided to patients about the benefits and limitations of testing and treatment so they can make informed decisions.   Uric acid   Result Value Ref Range    Uric Acid 8.7 (H) 3.4 - 7.0 mg/dL       If you have any questions or  concerns, please call the clinic at the number listed above.       Sincerely,      Jose Raul Trejo MD

## (undated) RX ORDER — DIPHENHYDRAMINE HYDROCHLORIDE 50 MG/ML
INJECTION INTRAMUSCULAR; INTRAVENOUS
Status: DISPENSED
Start: 2022-12-17

## (undated) RX ORDER — DIPHENHYDRAMINE HYDROCHLORIDE 50 MG/ML
INJECTION INTRAMUSCULAR; INTRAVENOUS
Status: DISPENSED
Start: 2022-10-31

## (undated) RX ORDER — LIDOCAINE 40 MG/G
CREAM TOPICAL
Status: DISPENSED
Start: 2022-12-17

## (undated) RX ORDER — TRANEXAMIC ACID 10 MG/ML
INJECTION, SOLUTION INTRAVENOUS
Status: DISPENSED
Start: 2022-10-31

## (undated) RX ORDER — TRANEXAMIC ACID 10 MG/ML
INJECTION, SOLUTION INTRAVENOUS
Status: DISPENSED
Start: 2022-12-17

## (undated) RX ORDER — DIPHENHYDRAMINE HYDROCHLORIDE 50 MG/ML
INJECTION INTRAMUSCULAR; INTRAVENOUS
Status: DISPENSED
Start: 2022-10-10

## (undated) RX ORDER — LIDOCAINE HYDROCHLORIDE 10 MG/ML
INJECTION, SOLUTION EPIDURAL; INFILTRATION; INTRACAUDAL; PERINEURAL
Status: DISPENSED
Start: 2024-09-02

## (undated) RX ORDER — METHYLPREDNISOLONE SODIUM SUCCINATE 125 MG/2ML
INJECTION, POWDER, LYOPHILIZED, FOR SOLUTION INTRAMUSCULAR; INTRAVENOUS
Status: DISPENSED
Start: 2022-10-10

## (undated) RX ORDER — TRANEXAMIC ACID 10 MG/ML
INJECTION, SOLUTION INTRAVENOUS
Status: DISPENSED
Start: 2022-10-10

## (undated) RX ORDER — METHYLPREDNISOLONE SODIUM SUCCINATE 125 MG/2ML
INJECTION, POWDER, LYOPHILIZED, FOR SOLUTION INTRAMUSCULAR; INTRAVENOUS
Status: DISPENSED
Start: 2022-12-17

## (undated) RX ORDER — DEXAMETHASONE SODIUM PHOSPHATE 10 MG/ML
INJECTION, SOLUTION INTRAMUSCULAR; INTRAVENOUS
Status: DISPENSED
Start: 2023-08-21

## (undated) RX ORDER — METHYLPREDNISOLONE SODIUM SUCCINATE 125 MG/2ML
INJECTION, POWDER, LYOPHILIZED, FOR SOLUTION INTRAMUSCULAR; INTRAVENOUS
Status: DISPENSED
Start: 2022-10-31

## (undated) RX ORDER — LORATADINE 10 MG/1
TABLET ORAL
Status: DISPENSED
Start: 2023-08-21